# Patient Record
Sex: MALE | Race: BLACK OR AFRICAN AMERICAN | NOT HISPANIC OR LATINO | Employment: UNEMPLOYED | URBAN - NONMETROPOLITAN AREA
[De-identification: names, ages, dates, MRNs, and addresses within clinical notes are randomized per-mention and may not be internally consistent; named-entity substitution may affect disease eponyms.]

---

## 2017-04-26 ENCOUNTER — HOSPITAL ENCOUNTER (INPATIENT)
Facility: HOSPITAL | Age: 47
LOS: 2 days | Discharge: HOME | End: 2017-04-28
Attending: INTERNAL MEDICINE | Admitting: INTERNAL MEDICINE

## 2017-04-26 ENCOUNTER — HOSPITAL ENCOUNTER (EMERGENCY)
Facility: HOSPITAL | Age: 47
End: 2017-04-26

## 2017-04-26 PROBLEM — I21.09 ACUTE MI ANTERIOR WALL FIRST EPISODE CARE (HCC): Status: ACTIVE | Noted: 2017-04-26

## 2017-04-26 LAB
ALBUMIN SERPL-MCNC: 4.3 G/DL (ref 3.4–4.8)
ALBUMIN/GLOB SERPL: 1.4 G/DL (ref 1.1–1.8)
ALP SERPL-CCNC: 108 U/L (ref 38–126)
ALT SERPL W P-5'-P-CCNC: 41 U/L (ref 21–72)
ANION GAP SERPL CALCULATED.3IONS-SCNC: 11 MMOL/L (ref 5–15)
ARTICHOKE IGE QN: 111 MG/DL (ref 1–129)
AST SERPL-CCNC: 71 U/L (ref 17–59)
BILIRUB SERPL-MCNC: 0.7 MG/DL (ref 0.2–1.3)
BUN BLD-MCNC: 9 MG/DL (ref 7–21)
BUN/CREAT SERPL: 13.6 (ref 7–25)
CALCIUM SPEC-SCNC: 9.3 MG/DL (ref 8.4–10.2)
CHLORIDE SERPL-SCNC: 101 MMOL/L (ref 95–110)
CHOLEST SERPL-MCNC: 178 MG/DL (ref 0–199)
CK MB SERPL-CCNC: 19.5 NG/ML (ref 0–5)
CK SERPL-CCNC: 544 U/L (ref 55–170)
CO2 SERPL-SCNC: 23 MMOL/L (ref 22–31)
CREAT BLD-MCNC: 0.66 MG/DL (ref 0.7–1.3)
DEPRECATED RDW RBC AUTO: 41.5 FL (ref 35.1–43.9)
ERYTHROCYTE [DISTWIDTH] IN BLOOD BY AUTOMATED COUNT: 14.1 % (ref 11.5–14.5)
GFR SERPL CREATININE-BSD FRML MDRD: 158 ML/MIN/1.73 (ref 63–147)
GLOBULIN UR ELPH-MCNC: 3 GM/DL (ref 2.3–3.5)
GLUCOSE BLD-MCNC: 116 MG/DL (ref 60–100)
HCT VFR BLD AUTO: 43.6 % (ref 39–49)
HDLC SERPL-MCNC: 50 MG/DL (ref 60–200)
HGB BLD-MCNC: 15.5 G/DL (ref 13.7–17.3)
LDLC/HDLC SERPL: 2.27 {RATIO} (ref 0–3.55)
MCH RBC QN AUTO: 28.7 PG (ref 26.5–34)
MCHC RBC AUTO-ENTMCNC: 35.6 G/DL (ref 31.5–36.3)
MCV RBC AUTO: 80.6 FL (ref 80–98)
PLATELET # BLD AUTO: 230 10*3/MM3 (ref 150–450)
PMV BLD AUTO: 10.1 FL (ref 8–12)
POTASSIUM BLD-SCNC: 3.6 MMOL/L (ref 3.5–5.1)
PROT SERPL-MCNC: 7.3 G/DL (ref 6.3–8.6)
RBC # BLD AUTO: 5.41 10*6/MM3 (ref 4.37–5.74)
SODIUM BLD-SCNC: 135 MMOL/L (ref 137–145)
TRIGL SERPL-MCNC: 73 MG/DL (ref 20–199)
TROPONIN I SERPL-MCNC: 10.2 NG/ML
TROPONIN I SERPL-MCNC: 9.43 NG/ML
WBC NRBC COR # BLD: 8.25 10*3/MM3 (ref 3.2–9.8)

## 2017-04-26 PROCEDURE — 93010 ELECTROCARDIOGRAM REPORT: CPT | Performed by: INTERNAL MEDICINE

## 2017-04-26 PROCEDURE — C1769 GUIDE WIRE: HCPCS | Performed by: INTERNAL MEDICINE

## 2017-04-26 PROCEDURE — 4A023N7 MEASUREMENT OF CARDIAC SAMPLING AND PRESSURE, LEFT HEART, PERCUTANEOUS APPROACH: ICD-10-PCS | Performed by: INTERNAL MEDICINE

## 2017-04-26 PROCEDURE — B2051ZZ PLAIN RADIOGRAPHY OF LEFT HEART USING LOW OSMOLAR CONTRAST: ICD-10-PCS | Performed by: INTERNAL MEDICINE

## 2017-04-26 PROCEDURE — 93458 L HRT ARTERY/VENTRICLE ANGIO: CPT | Performed by: INTERNAL MEDICINE

## 2017-04-26 PROCEDURE — 25010000002 FENTANYL CITRATE (PF) 100 MCG/2ML SOLUTION: Performed by: INTERNAL MEDICINE

## 2017-04-26 PROCEDURE — 82553 CREATINE MB FRACTION: CPT | Performed by: INTERNAL MEDICINE

## 2017-04-26 PROCEDURE — 92928 PRQ TCAT PLMT NTRAC ST 1 LES: CPT | Performed by: INTERNAL MEDICINE

## 2017-04-26 PROCEDURE — 93005 ELECTROCARDIOGRAM TRACING: CPT | Performed by: INTERNAL MEDICINE

## 2017-04-26 PROCEDURE — 82550 ASSAY OF CK (CPK): CPT | Performed by: INTERNAL MEDICINE

## 2017-04-26 PROCEDURE — 80053 COMPREHEN METABOLIC PANEL: CPT | Performed by: INTERNAL MEDICINE

## 2017-04-26 PROCEDURE — C1760 CLOSURE DEV, VASC: HCPCS | Performed by: INTERNAL MEDICINE

## 2017-04-26 PROCEDURE — C1874 STENT, COATED/COV W/DEL SYS: HCPCS | Performed by: INTERNAL MEDICINE

## 2017-04-26 PROCEDURE — C9606 PERC D-E COR REVASC W AMI S: HCPCS | Performed by: INTERNAL MEDICINE

## 2017-04-26 PROCEDURE — C1725 CATH, TRANSLUMIN NON-LASER: HCPCS | Performed by: INTERNAL MEDICINE

## 2017-04-26 PROCEDURE — 80061 LIPID PANEL: CPT | Performed by: INTERNAL MEDICINE

## 2017-04-26 PROCEDURE — C1887 CATHETER, GUIDING: HCPCS | Performed by: INTERNAL MEDICINE

## 2017-04-26 PROCEDURE — 94760 N-INVAS EAR/PLS OXIMETRY 1: CPT

## 2017-04-26 PROCEDURE — 93454 CORONARY ARTERY ANGIO S&I: CPT | Performed by: INTERNAL MEDICINE

## 2017-04-26 PROCEDURE — B2011ZZ PLAIN RADIOGRAPHY OF MULTIPLE CORONARY ARTERIES USING LOW OSMOLAR CONTRAST: ICD-10-PCS | Performed by: INTERNAL MEDICINE

## 2017-04-26 PROCEDURE — C1894 INTRO/SHEATH, NON-LASER: HCPCS | Performed by: INTERNAL MEDICINE

## 2017-04-26 PROCEDURE — 84484 ASSAY OF TROPONIN QUANT: CPT | Performed by: INTERNAL MEDICINE

## 2017-04-26 PROCEDURE — 25010000002 HYDRALAZINE PER 20 MG: Performed by: INTERNAL MEDICINE

## 2017-04-26 PROCEDURE — 99222 1ST HOSP IP/OBS MODERATE 55: CPT | Performed by: INTERNAL MEDICINE

## 2017-04-26 PROCEDURE — 94799 UNLISTED PULMONARY SVC/PX: CPT

## 2017-04-26 PROCEDURE — 027034Z DILATION OF CORONARY ARTERY, ONE ARTERY WITH DRUG-ELUTING INTRALUMINAL DEVICE, PERCUTANEOUS APPROACH: ICD-10-PCS | Performed by: INTERNAL MEDICINE

## 2017-04-26 PROCEDURE — 25010000002 PROTAMINE SULFATE PER 10 MG: Performed by: INTERNAL MEDICINE

## 2017-04-26 PROCEDURE — 0 IOPAMIDOL PER 1 ML: Performed by: INTERNAL MEDICINE

## 2017-04-26 PROCEDURE — 25010000002 HEPARIN (PORCINE) PER 1000 UNITS: Performed by: INTERNAL MEDICINE

## 2017-04-26 PROCEDURE — 85027 COMPLETE CBC AUTOMATED: CPT | Performed by: INTERNAL MEDICINE

## 2017-04-26 DEVICE — XIENCE ALPINE EVEROLIMUS ELUTING CORONARY STENT SYSTEM 4.00 MM X 23 MM / RAPID-EXCHANGE
Type: IMPLANTABLE DEVICE | Status: FUNCTIONAL
Brand: XIENCE ALPINE

## 2017-04-26 RX ORDER — METOPROLOL TARTRATE 50 MG/1
50 TABLET, FILM COATED ORAL EVERY 12 HOURS SCHEDULED
Status: DISCONTINUED | OUTPATIENT
Start: 2017-04-27 | End: 2017-04-28 | Stop reason: HOSPADM

## 2017-04-26 RX ORDER — ATORVASTATIN CALCIUM 10 MG/1
10 TABLET, FILM COATED ORAL DAILY
Status: ON HOLD | COMMUNITY
End: 2017-04-28

## 2017-04-26 RX ORDER — METOPROLOL TARTRATE 5 MG/5ML
INJECTION INTRAVENOUS
Status: COMPLETED
Start: 2017-04-26 | End: 2017-04-26

## 2017-04-26 RX ORDER — METOPROLOL TARTRATE 5 MG/5ML
5 INJECTION INTRAVENOUS ONCE
Status: COMPLETED | OUTPATIENT
Start: 2017-04-26 | End: 2017-04-26

## 2017-04-26 RX ORDER — HYDROCODONE BITARTRATE AND ACETAMINOPHEN 5; 325 MG/1; MG/1
1 TABLET ORAL EVERY 4 HOURS PRN
Status: DISCONTINUED | OUTPATIENT
Start: 2017-04-26 | End: 2017-04-28 | Stop reason: HOSPADM

## 2017-04-26 RX ORDER — NITROGLYCERIN 20 MG/100ML
5-200 INJECTION INTRAVENOUS
Status: DISCONTINUED | OUTPATIENT
Start: 2017-04-26 | End: 2017-04-27

## 2017-04-26 RX ORDER — PROMETHAZINE HYDROCHLORIDE 25 MG/ML
12.5 INJECTION, SOLUTION INTRAMUSCULAR; INTRAVENOUS EVERY 6 HOURS PRN
Status: DISCONTINUED | OUTPATIENT
Start: 2017-04-26 | End: 2017-04-28 | Stop reason: HOSPADM

## 2017-04-26 RX ORDER — CLOPIDOGREL BISULFATE 75 MG/1
75 TABLET ORAL DAILY
Status: DISCONTINUED | OUTPATIENT
Start: 2017-04-27 | End: 2017-04-28 | Stop reason: HOSPADM

## 2017-04-26 RX ORDER — LIDOCAINE HYDROCHLORIDE 20 MG/ML
INJECTION, SOLUTION INFILTRATION; PERINEURAL AS NEEDED
Status: DISCONTINUED | OUTPATIENT
Start: 2017-04-26 | End: 2017-04-26 | Stop reason: HOSPADM

## 2017-04-26 RX ORDER — HEPARIN SODIUM 1000 [USP'U]/ML
INJECTION, SOLUTION INTRAVENOUS; SUBCUTANEOUS AS NEEDED
Status: DISCONTINUED | OUTPATIENT
Start: 2017-04-26 | End: 2017-04-26 | Stop reason: HOSPADM

## 2017-04-26 RX ORDER — ALPRAZOLAM 0.25 MG/1
0.25 TABLET ORAL 3 TIMES DAILY PRN
Status: DISCONTINUED | OUTPATIENT
Start: 2017-04-26 | End: 2017-04-28 | Stop reason: HOSPADM

## 2017-04-26 RX ORDER — NICOTINE 21 MG/24HR
1 PATCH, TRANSDERMAL 24 HOURS TRANSDERMAL EVERY 24 HOURS
Status: DISCONTINUED | OUTPATIENT
Start: 2017-04-26 | End: 2017-04-26

## 2017-04-26 RX ORDER — CALCIUM CARBONATE 200(500)MG
2 TABLET,CHEWABLE ORAL 2 TIMES DAILY PRN
Status: DISCONTINUED | OUTPATIENT
Start: 2017-04-26 | End: 2017-04-28 | Stop reason: HOSPADM

## 2017-04-26 RX ORDER — ATORVASTATIN CALCIUM 40 MG/1
40 TABLET, FILM COATED ORAL NIGHTLY
Status: DISCONTINUED | OUTPATIENT
Start: 2017-04-26 | End: 2017-04-26

## 2017-04-26 RX ORDER — ASPIRIN 325 MG
325 TABLET, DELAYED RELEASE (ENTERIC COATED) ORAL DAILY
Status: DISCONTINUED | OUTPATIENT
Start: 2017-04-26 | End: 2017-04-28 | Stop reason: HOSPADM

## 2017-04-26 RX ORDER — DIPHENHYDRAMINE HYDROCHLORIDE 50 MG/ML
25 INJECTION INTRAMUSCULAR; INTRAVENOUS EVERY 6 HOURS PRN
Status: DISCONTINUED | OUTPATIENT
Start: 2017-04-26 | End: 2017-04-28 | Stop reason: HOSPADM

## 2017-04-26 RX ORDER — LOSARTAN POTASSIUM 50 MG/1
100 TABLET ORAL DAILY
COMMUNITY
End: 2017-04-28 | Stop reason: HOSPADM

## 2017-04-26 RX ORDER — METOPROLOL SUCCINATE 50 MG/1
50 TABLET, EXTENDED RELEASE ORAL DAILY
Status: ON HOLD | COMMUNITY
End: 2017-04-28

## 2017-04-26 RX ORDER — SODIUM CHLORIDE 9 MG/ML
50 INJECTION, SOLUTION INTRAVENOUS CONTINUOUS
Status: DISCONTINUED | OUTPATIENT
Start: 2017-04-26 | End: 2017-04-28 | Stop reason: HOSPADM

## 2017-04-26 RX ORDER — HYDRALAZINE HYDROCHLORIDE 20 MG/ML
INJECTION INTRAMUSCULAR; INTRAVENOUS AS NEEDED
Status: DISCONTINUED | OUTPATIENT
Start: 2017-04-26 | End: 2017-04-26 | Stop reason: HOSPADM

## 2017-04-26 RX ORDER — NITROGLYCERIN 20 MG/100ML
INJECTION INTRAVENOUS CONTINUOUS PRN
Status: DISCONTINUED | OUTPATIENT
Start: 2017-04-26 | End: 2017-04-26 | Stop reason: HOSPADM

## 2017-04-26 RX ORDER — TEMAZEPAM 15 MG/1
15 CAPSULE ORAL NIGHTLY PRN
Status: DISCONTINUED | OUTPATIENT
Start: 2017-04-26 | End: 2017-04-28 | Stop reason: HOSPADM

## 2017-04-26 RX ORDER — LOSARTAN POTASSIUM 50 MG/1
50 TABLET ORAL DAILY
Status: DISCONTINUED | OUTPATIENT
Start: 2017-04-26 | End: 2017-04-27

## 2017-04-26 RX ORDER — FENTANYL CITRATE 50 UG/ML
INJECTION, SOLUTION INTRAMUSCULAR; INTRAVENOUS AS NEEDED
Status: DISCONTINUED | OUTPATIENT
Start: 2017-04-26 | End: 2017-04-26 | Stop reason: HOSPADM

## 2017-04-26 RX ORDER — PROTAMINE SULFATE 10 MG/ML
INJECTION, SOLUTION INTRAVENOUS AS NEEDED
Status: DISCONTINUED | OUTPATIENT
Start: 2017-04-26 | End: 2017-04-26 | Stop reason: HOSPADM

## 2017-04-26 RX ADMIN — ASPIRIN 325 MG: 325 TABLET, DELAYED RELEASE ORAL at 14:55

## 2017-04-26 RX ADMIN — CALCIUM CARBONATE (ANTACID) CHEW TAB 500 MG 2 TABLET: 500 CHEW TAB at 20:36

## 2017-04-26 RX ADMIN — METOPROLOL TARTRATE 5 MG: 5 INJECTION INTRAVENOUS at 15:37

## 2017-04-26 RX ADMIN — METOPROLOL TARTRATE 25 MG: 25 TABLET ORAL at 17:27

## 2017-04-26 RX ADMIN — METOPROLOL TARTRATE 25 MG: 25 TABLET ORAL at 16:20

## 2017-04-26 RX ADMIN — SODIUM CHLORIDE 50 ML/HR: 9 INJECTION, SOLUTION INTRAVENOUS at 14:56

## 2017-04-26 RX ADMIN — NITROGLYCERIN 85 MCG/MIN: 20 INJECTION INTRAVENOUS at 15:41

## 2017-04-26 RX ADMIN — LOSARTAN POTASSIUM 50 MG: 50 TABLET, FILM COATED ORAL at 14:55

## 2017-04-26 RX ADMIN — HYDROCODONE BITARTRATE AND ACETAMINOPHEN 1 TABLET: 5; 325 TABLET ORAL at 15:01

## 2017-04-27 ENCOUNTER — APPOINTMENT (OUTPATIENT)
Dept: CARDIOLOGY | Facility: HOSPITAL | Age: 47
End: 2017-04-27
Attending: INTERNAL MEDICINE

## 2017-04-27 LAB
ANION GAP SERPL CALCULATED.3IONS-SCNC: 11 MMOL/L (ref 5–15)
BH CV ECHO MEAS - ACS: 2.5 CM
BH CV ECHO MEAS - AO MAX PG (FULL): 0.47 MMHG
BH CV ECHO MEAS - AO MAX PG: 8.4 MMHG
BH CV ECHO MEAS - AO MEAN PG (FULL): 1.5 MMHG
BH CV ECHO MEAS - AO MEAN PG: 5.8 MMHG
BH CV ECHO MEAS - AO ROOT AREA: 11.7 CM^2
BH CV ECHO MEAS - AO ROOT DIAM: 3.9 CM
BH CV ECHO MEAS - AO V2 MAX: 145.2 CM/SEC
BH CV ECHO MEAS - AO V2 MEAN: 116.7 CM/SEC
BH CV ECHO MEAS - AO V2 VTI: 23.8 CM
BH CV ECHO MEAS - AVA(I,A): 4.2 CM^2
BH CV ECHO MEAS - AVA(I,D): 4.2 CM^2
BH CV ECHO MEAS - AVA(V,A): 4 CM^2
BH CV ECHO MEAS - AVA(V,D): 4 CM^2
BH CV ECHO MEAS - EDV(CUBED): 81.6 ML
BH CV ECHO MEAS - EDV(TEICH): 84.8 ML
BH CV ECHO MEAS - EF(CUBED): 86.6 %
BH CV ECHO MEAS - EF(TEICH): 80.5 %
BH CV ECHO MEAS - ESV(CUBED): 10.9 ML
BH CV ECHO MEAS - ESV(TEICH): 16.6 ML
BH CV ECHO MEAS - FS: 48.8 %
BH CV ECHO MEAS - IVS/LVPW: 1.2
BH CV ECHO MEAS - IVSD: 1.6 CM
BH CV ECHO MEAS - LA DIMENSION: 3.5 CM
BH CV ECHO MEAS - LA/AO: 0.9
BH CV ECHO MEAS - LV MASS(C)D: 268.2 GRAMS
BH CV ECHO MEAS - LV MAX PG: 8 MMHG
BH CV ECHO MEAS - LV MEAN PG: 4.3 MMHG
BH CV ECHO MEAS - LV V1 MAX: 141.1 CM/SEC
BH CV ECHO MEAS - LV V1 MEAN: 95.3 CM/SEC
BH CV ECHO MEAS - LV V1 VTI: 24.2 CM
BH CV ECHO MEAS - LVIDD: 4.3 CM
BH CV ECHO MEAS - LVIDS: 2.2 CM
BH CV ECHO MEAS - LVOT AREA (M): 4.2 CM^2
BH CV ECHO MEAS - LVOT AREA: 4.1 CM^2
BH CV ECHO MEAS - LVOT DIAM: 2.3 CM
BH CV ECHO MEAS - LVPWD: 1.4 CM
BH CV ECHO MEAS - MV A MAX VEL: 87 CM/SEC
BH CV ECHO MEAS - MV DEC SLOPE: 408.9 CM/SEC^2
BH CV ECHO MEAS - MV E MAX VEL: 54.4 CM/SEC
BH CV ECHO MEAS - MV E/A: 0.63
BH CV ECHO MEAS - MV MAX PG: 8.5 MMHG
BH CV ECHO MEAS - MV MEAN PG: 3.2 MMHG
BH CV ECHO MEAS - MV P1/2T MAX VEL: 64.6 CM/SEC
BH CV ECHO MEAS - MV P1/2T: 46.2 MSEC
BH CV ECHO MEAS - MV V2 MAX: 145.4 CM/SEC
BH CV ECHO MEAS - MV V2 MEAN: 79.3 CM/SEC
BH CV ECHO MEAS - MV V2 VTI: 23.7 CM
BH CV ECHO MEAS - MVA P1/2T LCG: 3.4 CM^2
BH CV ECHO MEAS - MVA(P1/2T): 4.8 CM^2
BH CV ECHO MEAS - MVA(VTI): 4.2 CM^2
BH CV ECHO MEAS - PA MAX PG: 5.7 MMHG
BH CV ECHO MEAS - PA V2 MAX: 119.4 CM/SEC
BH CV ECHO MEAS - RAP SYSTOLE: 5 MMHG
BH CV ECHO MEAS - RVDD: 3 CM
BH CV ECHO MEAS - RVSP: 19.9 MMHG
BH CV ECHO MEAS - SV(AO): 279.8 ML
BH CV ECHO MEAS - SV(CUBED): 70.6 ML
BH CV ECHO MEAS - SV(LVOT): 100 ML
BH CV ECHO MEAS - SV(TEICH): 68.2 ML
BH CV ECHO MEAS - TR MAX VEL: 193 CM/SEC
BUN BLD-MCNC: 12 MG/DL (ref 7–21)
BUN/CREAT SERPL: 15.8 (ref 7–25)
CALCIUM SPEC-SCNC: 9.3 MG/DL (ref 8.4–10.2)
CHLORIDE SERPL-SCNC: 103 MMOL/L (ref 95–110)
CO2 SERPL-SCNC: 23 MMOL/L (ref 22–31)
CREAT BLD-MCNC: 0.76 MG/DL (ref 0.7–1.3)
DEPRECATED RDW RBC AUTO: 41.6 FL (ref 35.1–43.9)
ERYTHROCYTE [DISTWIDTH] IN BLOOD BY AUTOMATED COUNT: 14 % (ref 11.5–14.5)
GFR SERPL CREATININE-BSD FRML MDRD: 134 ML/MIN/1.73 (ref 63–147)
GLUCOSE BLD-MCNC: 116 MG/DL (ref 60–100)
HBA1C MFR BLD: 5.75 % (ref 4–5.6)
HCT VFR BLD AUTO: 40.9 % (ref 39–49)
HGB BLD-MCNC: 14.3 G/DL (ref 13.7–17.3)
MCH RBC QN AUTO: 28.4 PG (ref 26.5–34)
MCHC RBC AUTO-ENTMCNC: 35 G/DL (ref 31.5–36.3)
MCV RBC AUTO: 81.2 FL (ref 80–98)
PLATELET # BLD AUTO: 212 10*3/MM3 (ref 150–450)
PMV BLD AUTO: 10.1 FL (ref 8–12)
POTASSIUM BLD-SCNC: 3.5 MMOL/L (ref 3.5–5.1)
RBC # BLD AUTO: 5.04 10*6/MM3 (ref 4.37–5.74)
SODIUM BLD-SCNC: 137 MMOL/L (ref 137–145)
TROPONIN I SERPL-MCNC: 5.24 NG/ML
WBC NRBC COR # BLD: 10.13 10*3/MM3 (ref 3.2–9.8)

## 2017-04-27 PROCEDURE — 85027 COMPLETE CBC AUTOMATED: CPT | Performed by: INTERNAL MEDICINE

## 2017-04-27 PROCEDURE — 93306 TTE W/DOPPLER COMPLETE: CPT | Performed by: INTERNAL MEDICINE

## 2017-04-27 PROCEDURE — 80048 BASIC METABOLIC PNL TOTAL CA: CPT | Performed by: INTERNAL MEDICINE

## 2017-04-27 PROCEDURE — 94799 UNLISTED PULMONARY SVC/PX: CPT

## 2017-04-27 PROCEDURE — 93306 TTE W/DOPPLER COMPLETE: CPT

## 2017-04-27 PROCEDURE — 84484 ASSAY OF TROPONIN QUANT: CPT | Performed by: INTERNAL MEDICINE

## 2017-04-27 PROCEDURE — 83036 HEMOGLOBIN GLYCOSYLATED A1C: CPT | Performed by: INTERNAL MEDICINE

## 2017-04-27 PROCEDURE — 99232 SBSQ HOSP IP/OBS MODERATE 35: CPT | Performed by: INTERNAL MEDICINE

## 2017-04-27 RX ORDER — TRIAMTERENE AND HYDROCHLOROTHIAZIDE 37.5; 25 MG/1; MG/1
1 CAPSULE ORAL DAILY
Status: DISCONTINUED | OUTPATIENT
Start: 2017-04-27 | End: 2017-04-28 | Stop reason: HOSPADM

## 2017-04-27 RX ORDER — ISOSORBIDE MONONITRATE 30 MG/1
30 TABLET, EXTENDED RELEASE ORAL
Status: DISCONTINUED | OUTPATIENT
Start: 2017-04-27 | End: 2017-04-28

## 2017-04-27 RX ORDER — LOSARTAN POTASSIUM 50 MG/1
50 TABLET ORAL ONCE
Status: COMPLETED | OUTPATIENT
Start: 2017-04-27 | End: 2017-04-27

## 2017-04-27 RX ORDER — ATORVASTATIN CALCIUM 40 MG/1
40 TABLET, FILM COATED ORAL NIGHTLY
Status: DISCONTINUED | OUTPATIENT
Start: 2017-04-27 | End: 2017-04-27

## 2017-04-27 RX ORDER — LOSARTAN POTASSIUM 50 MG/1
100 TABLET ORAL DAILY
Status: DISCONTINUED | OUTPATIENT
Start: 2017-04-28 | End: 2017-04-28 | Stop reason: HOSPADM

## 2017-04-27 RX ADMIN — LOSARTAN POTASSIUM 50 MG: 50 TABLET, FILM COATED ORAL at 12:38

## 2017-04-27 RX ADMIN — TEMAZEPAM 15 MG: 15 CAPSULE ORAL at 19:54

## 2017-04-27 RX ADMIN — TRIAMTERENE AND HYDROCHLOROTHIAZIDE 1 CAPSULE: 25; 37.5 CAPSULE ORAL at 20:39

## 2017-04-27 RX ADMIN — ASPIRIN 325 MG: 325 TABLET, DELAYED RELEASE ORAL at 08:57

## 2017-04-27 RX ADMIN — HYDROCODONE BITARTRATE AND ACETAMINOPHEN 1 TABLET: 5; 325 TABLET ORAL at 01:54

## 2017-04-27 RX ADMIN — METOPROLOL TARTRATE 50 MG: 50 TABLET ORAL at 08:57

## 2017-04-27 RX ADMIN — SODIUM CHLORIDE 50 ML/HR: 9 INJECTION, SOLUTION INTRAVENOUS at 05:28

## 2017-04-27 RX ADMIN — HYDROCODONE BITARTRATE AND ACETAMINOPHEN 1 TABLET: 5; 325 TABLET ORAL at 16:11

## 2017-04-27 RX ADMIN — HYDROCODONE BITARTRATE AND ACETAMINOPHEN 1 TABLET: 5; 325 TABLET ORAL at 20:40

## 2017-04-27 RX ADMIN — LOSARTAN POTASSIUM 50 MG: 50 TABLET, FILM COATED ORAL at 08:57

## 2017-04-27 RX ADMIN — METOPROLOL TARTRATE 50 MG: 50 TABLET ORAL at 19:54

## 2017-04-27 RX ADMIN — CLOPIDOGREL BISULFATE 75 MG: 75 TABLET ORAL at 08:57

## 2017-04-27 RX ADMIN — ISOSORBIDE MONONITRATE 30 MG: 30 TABLET, EXTENDED RELEASE ORAL at 20:40

## 2017-04-28 VITALS
WEIGHT: 212.8 LBS | SYSTOLIC BLOOD PRESSURE: 160 MMHG | TEMPERATURE: 97.1 F | OXYGEN SATURATION: 97 % | BODY MASS INDEX: 29.79 KG/M2 | HEIGHT: 71 IN | DIASTOLIC BLOOD PRESSURE: 120 MMHG | HEART RATE: 76 BPM | RESPIRATION RATE: 18 BRPM

## 2017-04-28 LAB
ANION GAP SERPL CALCULATED.3IONS-SCNC: 9 MMOL/L (ref 5–15)
BUN BLD-MCNC: 11 MG/DL (ref 7–21)
BUN/CREAT SERPL: 12.9 (ref 7–25)
CALCIUM SPEC-SCNC: 9.2 MG/DL (ref 8.4–10.2)
CHLORIDE SERPL-SCNC: 99 MMOL/L (ref 95–110)
CO2 SERPL-SCNC: 28 MMOL/L (ref 22–31)
CREAT BLD-MCNC: 0.85 MG/DL (ref 0.7–1.3)
GFR SERPL CREATININE-BSD FRML MDRD: 118 ML/MIN/1.73 (ref 63–147)
GLUCOSE BLD-MCNC: 128 MG/DL (ref 60–100)
MAGNESIUM SERPL-MCNC: 2 MG/DL (ref 1.6–2.3)
POTASSIUM BLD-SCNC: 3.7 MMOL/L (ref 3.5–5.1)
SODIUM BLD-SCNC: 136 MMOL/L (ref 137–145)
WHOLE BLOOD HOLD SPECIMEN: NORMAL

## 2017-04-28 PROCEDURE — 99238 HOSP IP/OBS DSCHRG MGMT 30/<: CPT | Performed by: INTERNAL MEDICINE

## 2017-04-28 PROCEDURE — 83735 ASSAY OF MAGNESIUM: CPT | Performed by: INTERNAL MEDICINE

## 2017-04-28 PROCEDURE — 80048 BASIC METABOLIC PNL TOTAL CA: CPT | Performed by: INTERNAL MEDICINE

## 2017-04-28 RX ORDER — METOPROLOL SUCCINATE 50 MG/1
50 TABLET, EXTENDED RELEASE ORAL DAILY
Qty: 30 TABLET | Refills: 6 | Status: SHIPPED | OUTPATIENT
Start: 2017-04-28 | End: 2017-10-27 | Stop reason: SDUPTHER

## 2017-04-28 RX ORDER — ATORVASTATIN CALCIUM 40 MG/1
40 TABLET, FILM COATED ORAL DAILY
Qty: 30 TABLET | Refills: 6 | Status: SHIPPED | OUTPATIENT
Start: 2017-04-28 | End: 2017-06-06 | Stop reason: SDUPTHER

## 2017-04-28 RX ORDER — ISOSORBIDE MONONITRATE 60 MG/1
60 TABLET, EXTENDED RELEASE ORAL
Qty: 30 TABLET | Refills: 6 | Status: SHIPPED | OUTPATIENT
Start: 2017-04-28 | End: 2017-10-27 | Stop reason: SDUPTHER

## 2017-04-28 RX ORDER — ISOSORBIDE MONONITRATE 60 MG/1
60 TABLET, EXTENDED RELEASE ORAL
Status: DISCONTINUED | OUTPATIENT
Start: 2017-04-28 | End: 2017-04-28 | Stop reason: HOSPADM

## 2017-04-28 RX ORDER — TRIAMTERENE AND HYDROCHLOROTHIAZIDE 37.5; 25 MG/1; MG/1
1 CAPSULE ORAL DAILY
Qty: 30 CAPSULE | Refills: 6 | Status: SHIPPED | OUTPATIENT
Start: 2017-04-28 | End: 2017-10-27 | Stop reason: SDUPTHER

## 2017-04-28 RX ORDER — LOSARTAN POTASSIUM 100 MG/1
100 TABLET ORAL DAILY
Qty: 30 TABLET | Refills: 6 | Status: SHIPPED | OUTPATIENT
Start: 2017-04-28 | End: 2017-10-27 | Stop reason: SDUPTHER

## 2017-04-28 RX ORDER — CLOPIDOGREL BISULFATE 75 MG/1
75 TABLET ORAL DAILY
Qty: 30 TABLET | Refills: 11 | Status: SHIPPED | OUTPATIENT
Start: 2017-04-28 | End: 2017-11-01 | Stop reason: SDUPTHER

## 2017-04-28 RX ADMIN — HYDROCODONE BITARTRATE AND ACETAMINOPHEN 1 TABLET: 5; 325 TABLET ORAL at 04:58

## 2017-05-02 ENCOUNTER — DOCUMENTATION (OUTPATIENT)
Dept: CARDIAC REHAB | Facility: HOSPITAL | Age: 47
End: 2017-05-02

## 2017-06-06 ENCOUNTER — OFFICE VISIT (OUTPATIENT)
Dept: FAMILY MEDICINE CLINIC | Facility: CLINIC | Age: 47
End: 2017-06-06

## 2017-06-06 VITALS
WEIGHT: 221.4 LBS | BODY MASS INDEX: 31 KG/M2 | HEIGHT: 71 IN | SYSTOLIC BLOOD PRESSURE: 138 MMHG | RESPIRATION RATE: 16 BRPM | HEART RATE: 63 BPM | TEMPERATURE: 97.9 F | DIASTOLIC BLOOD PRESSURE: 102 MMHG

## 2017-06-06 DIAGNOSIS — E78.5 HYPERLIPIDEMIA, UNSPECIFIED HYPERLIPIDEMIA TYPE: ICD-10-CM

## 2017-06-06 DIAGNOSIS — I10 ESSENTIAL HYPERTENSION: ICD-10-CM

## 2017-06-06 DIAGNOSIS — I25.119 CORONARY ARTERY DISEASE INVOLVING NATIVE HEART WITH ANGINA PECTORIS, UNSPECIFIED VESSEL OR LESION TYPE (HCC): ICD-10-CM

## 2017-06-06 DIAGNOSIS — I21.09 ACUTE MI ANTERIOR WALL FIRST EPISODE CARE (HCC): Primary | ICD-10-CM

## 2017-06-06 DIAGNOSIS — Z95.820 S/P ANGIOPLASTY WITH STENT: ICD-10-CM

## 2017-06-06 PROCEDURE — 99204 OFFICE O/P NEW MOD 45 MIN: CPT | Performed by: FAMILY MEDICINE

## 2017-06-06 RX ORDER — AMLODIPINE BESYLATE 5 MG/1
5 TABLET ORAL DAILY
Qty: 30 TABLET | Refills: 11 | Status: SHIPPED | OUTPATIENT
Start: 2017-06-06 | End: 2017-06-20

## 2017-06-06 RX ORDER — ATORVASTATIN CALCIUM 40 MG/1
20 TABLET, FILM COATED ORAL NIGHTLY
Qty: 30 TABLET | Refills: 6 | Status: SHIPPED | OUTPATIENT
Start: 2017-06-06 | End: 2017-11-01 | Stop reason: SDUPTHER

## 2017-06-06 NOTE — PROGRESS NOTES
Subjective   Sourav Moreno is a 46 y.o. male.     Problem List  1. Previous MIS sp cardiac catherization/PCI sp stent one  Drug eluting stent in LAD  2. Hyperlipidemia  3. Prediabetes   4. Essential Hypertension     Family Hx  Mother - heart disease  Father -  Unsure    Pt is 47 yo AAM with the above medical issues. Is here to establish.  Pt states in April 2017 pt experienced chest pain, dizziness and shortness of breath.  He went to [facility] and was airlifted to [facility] and was found to have an Acute anterior ST segment elevation MI. Pt had PCI done with Dr. Martinez and has one drug eluting stent in anterior descending artery.  Pt was eventually discharged on 4/26/17 in stable condition.  He is now taking aspirin 325 mg PO q daily along with lipitor 40 mg PO qhs. plavix 75 mg PO q daily. Imdur 60 mg PO q daily. Losartan 100 mg PO q daily.  Toprol XL 50 mg PO q daily and Dyazide 37.5-25 mg PO q daily.  Pt also has history of hypertension that is not at goal today.  Pt denies smoking. Does have family history of heart disease (Mother).  States since the procedure his chest pain and shortness of breath have resolved. 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  He is also prediabetic.  States he cannot tolerate statin medication lipitor 40 mg PO qhs because it gives him headaches.  He has been taking it during the daytime and not bedtime. Pt has appt with Dr. Martinez (Cardiology) on 6/12/17    Hypertension   This is a recurrent problem. The current episode started more than 1 month ago. The problem is unchanged. The problem is uncontrolled. Associated symptoms include chest pain. Pertinent negatives include no anxiety, blurred vision, headaches, malaise/fatigue, neck pain, orthopnea, palpitations, peripheral edema, PND or shortness of breath. There are no associated agents to hypertension. Risk factors for coronary artery disease include male gender, obesity, sedentary lifestyle and dyslipidemia. Past treatments include  angiotensin blockers, beta blockers, diuretics and lifestyle changes. Compliance problems include medication side effects.  Hypertensive end-organ damage includes CAD/MI. There is no history of angina, kidney disease, CVA, heart failure, left ventricular hypertrophy, PVD, renovascular disease or retinopathy. There is no history of chronic renal disease, coarctation of the aorta, hyperaldosteronism, hypercortisolism, hyperparathyroidism, a hypertension causing med, pheochromocytoma or sleep apnea.   Coronary Artery Disease   Presents for initial visit. The disease course has been stable. Symptoms include chest pain and chest tightness. Pertinent negatives include no chest pressure, dizziness, leg swelling, muscle weakness, palpitations, shortness of breath or weight gain. Risk factors include obesity and stress. Risk factors do not include decreased physical activity, diabetes, premature CAD in family, hyperlipidemia or hypertension. Past treatments include antiplatelet agents, aspirin and nitrates. The treatment provided significant relief. Compliance with prior treatments has been good. His past medical history is significant for past myocardial infarction and valvular heart disease. There is no history of angina pectoris, arrhythmia, cardiomyopathy, CHF or pericarditis. Past surgical history includes angioplasty and cardiac stents.      The following portions of the patient's history were reviewed and updated as appropriate: allergies, current medications, past family history, past medical history, past social history, past surgical history and problem list.    Review of Systems   Constitutional: Negative.  Negative for malaise/fatigue and weight gain.   HENT: Negative.    Eyes: Negative.  Negative for blurred vision.   Respiratory: Positive for chest tightness. Negative for shortness of breath.    Cardiovascular: Positive for chest pain. Negative for palpitations, orthopnea, leg swelling and PND.  "  Gastrointestinal: Negative.    Endocrine: Negative.    Genitourinary: Negative.    Musculoskeletal: Negative.  Negative for muscle weakness and neck pain.   Skin: Negative.    Allergic/Immunologic: Negative.    Neurological: Negative.  Negative for dizziness and headaches.   Hematological: Negative.    Psychiatric/Behavioral: Negative.        Objective    BP (!) 138/102  Pulse 63  Temp 97.9 °F (36.6 °C)  Resp 16  Ht 71\" (180.3 cm)  Wt 221 lb 6.4 oz (100 kg)  BMI 30.88 kg/m2    BP (!) 138/102  Pulse 63  Temp 97.9 °F (36.6 °C)  Resp 16  Ht 71\" (180.3 cm)  Wt 221 lb 6.4 oz (100 kg)  BMI 30.88 kg/m2    Chemistry        Component Value Date/Time     (L) 04/28/2017 0607    K 3.7 04/28/2017 0607    CL 99 04/28/2017 0607    CO2 28.0 04/28/2017 0607    BUN 11 04/28/2017 0607    CREATININE 0.85 04/28/2017 0607        Component Value Date/Time    CALCIUM 9.2 04/28/2017 0607    ALKPHOS 108 04/26/2017 1557    AST 71 (H) 04/26/2017 1557    ALT 41 04/26/2017 1557    BILITOT 0.7 04/26/2017 1557        Lab Results   Component Value Date    WBC 10.13 (H) 04/27/2017    HGB 14.3 04/27/2017    HCT 40.9 04/27/2017    MCV 81.2 04/27/2017     04/27/2017     Lab Results   Component Value Date    CHOL 178 04/26/2017     Lab Results   Component Value Date    TRIG 73 04/26/2017     Lab Results   Component Value Date    HDL 50 (L) 04/26/2017     No results found for: LDLCALC  No results found for: LDL  No results found for: HDLLDLRATIO  No components found for: CHOLHDL  Lab Results   Component Value Date    HGBA1C 5.75 (H) 04/27/2017     Admission on 04/26/2017, Discharged on 04/28/2017   Component Date Value Ref Range Status   •  CV ECHO RAJENDRA - RVDD 04/27/2017 3.0  cm Preliminary   • IVSd 04/27/2017 1.6  cm Preliminary   • LVIDd 04/27/2017 4.3  cm Preliminary   • LVIDs 04/27/2017 2.2  cm Preliminary   • LVPWd 04/27/2017 1.4  cm Preliminary   • IVS/LVPW 04/27/2017 1.2   Preliminary   • FS 04/27/2017 48.8  % " Preliminary   • EDV(Teich) 04/27/2017 84.8  ml Preliminary   • ESV(Teich) 04/27/2017 16.6  ml Preliminary   • EF(Teich) 04/27/2017 80.5  % Preliminary   • EDV(cubed) 04/27/2017 81.6  ml Preliminary   • ESV(cubed) 04/27/2017 10.9  ml Preliminary   • EF(cubed) 04/27/2017 86.6  % Preliminary   • LV mass(C)d 04/27/2017 268.2  grams Preliminary   • SV(Teich) 04/27/2017 68.2  ml Preliminary   • SV(cubed) 04/27/2017 70.6  ml Preliminary   • Ao root diam 04/27/2017 3.9  cm Preliminary   • Ao root area 04/27/2017 11.7  cm^2 Preliminary   • ACS 04/27/2017 2.5  cm Preliminary   • LA dimension 04/27/2017 3.5  cm Preliminary   • LA/Ao 04/27/2017 0.9   Preliminary   • LVOT diam 04/27/2017 2.3  cm Preliminary   • LVOT area 04/27/2017 4.1  cm^2 Preliminary   • LVOT area(traced) 04/27/2017 4.2  cm^2 Preliminary   • MV E max la 04/27/2017 54.4  cm/sec Preliminary   • MV A max la 04/27/2017 87.0  cm/sec Preliminary   • MV E/A 04/27/2017 0.63   Preliminary   • MV V2 max 04/27/2017 145.4  cm/sec Preliminary   • MV max PG 04/27/2017 8.5  mmHg Preliminary   • MV V2 mean 04/27/2017 79.3  cm/sec Preliminary   • MV mean PG 04/27/2017 3.2  mmHg Preliminary   • MV V2 VTI 04/27/2017 23.7  cm Preliminary   • MVA(VTI) 04/27/2017 4.2  cm^2 Preliminary   • MV P1/2t max la 04/27/2017 64.6  cm/sec Preliminary   • MV P1/2t 04/27/2017 46.2  msec Preliminary   • MVA(P1/2t) 04/27/2017 4.8  cm^2 Preliminary   • MV dec slope 04/27/2017 408.9  cm/sec^2 Preliminary   • Ao pk la 04/27/2017 145.2  cm/sec Preliminary   • Ao max PG 04/27/2017 8.4  mmHg Preliminary   • Ao max PG (full) 04/27/2017 0.47  mmHg Preliminary   • Ao V2 mean 04/27/2017 116.7  cm/sec Preliminary   • Ao mean PG 04/27/2017 5.8  mmHg Preliminary   • Ao mean PG (full) 04/27/2017 1.5  mmHg Preliminary   • Ao V2 VTI 04/27/2017 23.8  cm Preliminary   • ALHAJI(I,A) 04/27/2017 4.2  cm^2 Preliminary   • ALHAJI(I,D) 04/27/2017 4.2  cm^2 Preliminary   • ALHAJI(V,A) 04/27/2017 4.0  cm^2 Preliminary   •  ALHAJI(V,D) 04/27/2017 4.0  cm^2 Preliminary   • LV V1 max PG 04/27/2017 8.0  mmHg Preliminary   • LV V1 mean PG 04/27/2017 4.3  mmHg Preliminary   • LV V1 max 04/27/2017 141.1  cm/sec Preliminary   • LV V1 mean 04/27/2017 95.3  cm/sec Preliminary   • LV V1 VTI 04/27/2017 24.2  cm Preliminary   • SV(Ao) 04/27/2017 279.8  ml Preliminary   • SV(LVOT) 04/27/2017 100.0  ml Preliminary   • PA V2 max 04/27/2017 119.4  cm/sec Preliminary   • PA max PG 04/27/2017 5.7  mmHg Preliminary   • TR max la 04/27/2017 193.0  cm/sec Preliminary   • RVSP(TR) 04/27/2017 19.9  mmHg Preliminary   • RAP systole 04/27/2017 5.0  mmHg Preliminary   • MVA P1/2T LCG 04/27/2017 3.4  cm^2 Preliminary   • Troponin I 04/26/2017 10.200* <=0.034 ng/mL Final   • Troponin I 04/26/2017 9.430* <=0.034 ng/mL Final   • Creatine Kinase 04/26/2017 544* 55 - 170 U/L Final   • CKMB 04/26/2017 19.50* 0.00 - 5.00 ng/mL Final   • Glucose 04/26/2017 116* 60 - 100 mg/dL Final   • BUN 04/26/2017 9  7 - 21 mg/dL Final   • Creatinine 04/26/2017 0.66* 0.70 - 1.30 mg/dL Final   • Sodium 04/26/2017 135* 137 - 145 mmol/L Final   • Potassium 04/26/2017 3.6  3.5 - 5.1 mmol/L Final   • Chloride 04/26/2017 101  95 - 110 mmol/L Final   • CO2 04/26/2017 23.0  22.0 - 31.0 mmol/L Final   • Calcium 04/26/2017 9.3  8.4 - 10.2 mg/dL Final   • Total Protein 04/26/2017 7.3  6.3 - 8.6 g/dL Final   • Albumin 04/26/2017 4.30  3.40 - 4.80 g/dL Final   • ALT (SGPT) 04/26/2017 41  21 - 72 U/L Final   • AST (SGOT) 04/26/2017 71* 17 - 59 U/L Final   • Alkaline Phosphatase 04/26/2017 108  38 - 126 U/L Final   • Total Bilirubin 04/26/2017 0.7  0.2 - 1.3 mg/dL Final   • eGFR   Amer 04/26/2017 158* 63 - 147 mL/min/1.73 Final   • Globulin 04/26/2017 3.0  2.3 - 3.5 gm/dL Final   • A/G Ratio 04/26/2017 1.4  1.1 - 1.8 g/dL Final   • BUN/Creatinine Ratio 04/26/2017 13.6  7.0 - 25.0 Final   • Anion Gap 04/26/2017 11.0  5.0 - 15.0 mmol/L Final   • WBC 04/26/2017 8.25  3.20 - 9.80 10*3/mm3 Final    • RBC 04/26/2017 5.41  4.37 - 5.74 10*6/mm3 Final   • Hemoglobin 04/26/2017 15.5  13.7 - 17.3 g/dL Final   • Hematocrit 04/26/2017 43.6  39.0 - 49.0 % Final   • MCV 04/26/2017 80.6  80.0 - 98.0 fL Final   • MCH 04/26/2017 28.7  26.5 - 34.0 pg Final   • MCHC 04/26/2017 35.6  31.5 - 36.3 g/dL Final   • RDW 04/26/2017 14.1  11.5 - 14.5 % Final   • RDW-SD 04/26/2017 41.5  35.1 - 43.9 fl Final   • MPV 04/26/2017 10.1  8.0 - 12.0 fL Final   • Platelets 04/26/2017 230  150 - 450 10*3/mm3 Final   • Total Cholesterol 04/26/2017 178  0 - 199 mg/dL Final   • Triglycerides 04/26/2017 73  20 - 199 mg/dL Final   • HDL Cholesterol 04/26/2017 50* 60 - 200 mg/dL Final   • LDL Cholesterol  04/26/2017 111  1 - 129 mg/dL Final   • LDL/HDL Ratio 04/26/2017 2.27  0.00 - 3.55 Final   • Troponin I 04/27/2017 5.240* <=0.034 ng/mL Final   • WBC 04/27/2017 10.13* 3.20 - 9.80 10*3/mm3 Final   • RBC 04/27/2017 5.04  4.37 - 5.74 10*6/mm3 Final   • Hemoglobin 04/27/2017 14.3  13.7 - 17.3 g/dL Final   • Hematocrit 04/27/2017 40.9  39.0 - 49.0 % Final   • MCV 04/27/2017 81.2  80.0 - 98.0 fL Final   • MCH 04/27/2017 28.4  26.5 - 34.0 pg Final   • MCHC 04/27/2017 35.0  31.5 - 36.3 g/dL Final   • RDW 04/27/2017 14.0  11.5 - 14.5 % Final   • RDW-SD 04/27/2017 41.6  35.1 - 43.9 fl Final   • MPV 04/27/2017 10.1  8.0 - 12.0 fL Final   • Platelets 04/27/2017 212  150 - 450 10*3/mm3 Final   • Glucose 04/27/2017 116* 60 - 100 mg/dL Final   • BUN 04/27/2017 12  7 - 21 mg/dL Final   • Creatinine 04/27/2017 0.76  0.70 - 1.30 mg/dL Final   • Sodium 04/27/2017 137  137 - 145 mmol/L Final   • Potassium 04/27/2017 3.5  3.5 - 5.1 mmol/L Final   • Chloride 04/27/2017 103  95 - 110 mmol/L Final   • CO2 04/27/2017 23.0  22.0 - 31.0 mmol/L Final   • Calcium 04/27/2017 9.3  8.4 - 10.2 mg/dL Final   • eGFR   Amer 04/27/2017 134  63 - 147 mL/min/1.73 Final   • BUN/Creatinine Ratio 04/27/2017 15.8  7.0 - 25.0 Final   • Anion Gap 04/27/2017 11.0  5.0 - 15.0 mmol/L  Final   • Hemoglobin A1C 04/27/2017 5.75* 4 - 5.6 % Final   • Glucose 04/28/2017 128* 60 - 100 mg/dL Final   • BUN 04/28/2017 11  7 - 21 mg/dL Final   • Creatinine 04/28/2017 0.85  0.70 - 1.30 mg/dL Final   • Sodium 04/28/2017 136* 137 - 145 mmol/L Final   • Potassium 04/28/2017 3.7  3.5 - 5.1 mmol/L Final   • Chloride 04/28/2017 99  95 - 110 mmol/L Final   • CO2 04/28/2017 28.0  22.0 - 31.0 mmol/L Final   • Calcium 04/28/2017 9.2  8.4 - 10.2 mg/dL Final   • eGFR   Amer 04/28/2017 118  63 - 147 mL/min/1.73 Final   • BUN/Creatinine Ratio 04/28/2017 12.9  7.0 - 25.0 Final   • Anion Gap 04/28/2017 9.0  5.0 - 15.0 mmol/L Final   • Magnesium 04/28/2017 2.0  1.6 - 2.3 mg/dL Final   • Extra Tube 04/28/2017 hold for add-on   Final    Auto resulted       Physical Exam   Constitutional: He is oriented to person, place, and time. He appears well-developed and well-nourished. No distress.   HENT:   Head: Normocephalic and atraumatic.   Right Ear: External ear normal.   Left Ear: External ear normal.   Eyes: Conjunctivae and EOM are normal. Pupils are equal, round, and reactive to light. Right eye exhibits no discharge. Left eye exhibits no discharge. No scleral icterus.   Neck: Normal range of motion. Neck supple. No JVD present. No tracheal deviation present. No thyromegaly present.   Cardiovascular: Normal rate and regular rhythm.    Pulmonary/Chest: Effort normal and breath sounds normal. No stridor. No respiratory distress. He has no wheezes.   Abdominal: Soft. Bowel sounds are normal. He exhibits no distension and no mass. There is no tenderness. There is no rebound and no guarding. No hernia.   Musculoskeletal: Normal range of motion. He exhibits no edema, tenderness or deformity.   Lymphadenopathy:     He has no cervical adenopathy.   Neurological: He is alert and oriented to person, place, and time. He has normal reflexes. No cranial nerve deficit. Coordination normal.   Skin: Skin is warm and dry. No rash noted.  He is not diaphoretic. No erythema. No pallor.   Psychiatric: He has a normal mood and affect. His behavior is normal.   Nursing note and vitals reviewed.      Assessment/Plan   Problems Addressed this Visit        Cardiovascular and Mediastinum    Acute MI anterior wall first episode care - Primary    Relevant Medications    amLODIPine (NORVASC) 5 MG tablet    Hyperlipidemia    Relevant Medications    atorvastatin (LIPITOR) 40 MG tablet    Essential hypertension    Relevant Medications    amLODIPine (NORVASC) 5 MG tablet       Other    S/P angioplasty with stent      Other Visit Diagnoses     Coronary artery disease involving native heart with angina pectoris, unspecified vessel or lesion type            - for CAD continue aspirin, losartan plavix, imdur, losartan and metoprolol  - hyperlipidemia - advised pt to cut down on lipitor from 40 to 20 mg at bedtime and see how he does with that  - hypertension - continue with losartan, dyazide and metoprolol. BP not at goal. Will add norvasc 5 mg PO q daily  - sp angioplasty with stent - Cardiology following. Pt has appt with Dr. Martinez next week  - Advised pt to check BP at home.  Gave prescription for BP to check at home. Goal of BP <140/90  - recheck in 2 weeks

## 2017-06-06 NOTE — PATIENT INSTRUCTIONS
BP machine and check Blood pressure daily. Get at pharmacy or at medical supply store such as [facility] or [facility]    Goal of BP should be <140/90    Amlodipine tablets  What is this medicine?  AMLODIPINE (am NIKOLAY arnoldo viviane) is a calcium-channel blocker. It affects the amount of calcium found in your heart and muscle cells. This relaxes your blood vessels, which can reduce the amount of work the heart has to do. This medicine is used to lower high blood pressure. It is also used to prevent chest pain.  This medicine may be used for other purposes; ask your health care provider or pharmacist if you have questions.  COMMON BRAND NAME(S): Norvasc  What should I tell my health care provider before I take this medicine?  They need to know if you have any of these conditions:  -heart problems like heart failure or aortic stenosis  -liver disease  -an unusual or allergic reaction to amlodipine, other medicines, foods, dyes, or preservatives  -pregnant or trying to get pregnant  -breast-feeding  How should I use this medicine?  Take this medicine by mouth with a glass of water. Follow the directions on the prescription label. Take your medicine at regular intervals. Do not take more medicine than directed.  Talk to your pediatrician regarding the use of this medicine in children. Special care may be needed. This medicine has been used in children as young as 6.  Persons over 65 years old may have a stronger reaction to this medicine and need smaller doses.  Overdosage: If you think you have taken too much of this medicine contact a poison control center or emergency room at once.  NOTE: This medicine is only for you. Do not share this medicine with others.  What if I miss a dose?  If you miss a dose, take it as soon as you can. If it is almost time for your next dose, take only that dose. Do not take double or extra doses.  What may interact with this medicine?  -herbal or dietary  supplements  -local or general anesthetics  -medicines for high blood pressure  -medicines for prostate problems  -rifampin  This list may not describe all possible interactions. Give your health care provider a list of all the medicines, herbs, non-prescription drugs, or dietary supplements you use. Also tell them if you smoke, drink alcohol, or use illegal drugs. Some items may interact with your medicine.  What should I watch for while using this medicine?  Visit your doctor or health care professional for regular check ups. Check your blood pressure and pulse rate regularly. Ask your health care professional what your blood pressure and pulse rate should be, and when you should contact him or her.  This medicine may make you feel confused, dizzy or lightheaded. Do not drive, use machinery, or do anything that needs mental alertness until you know how this medicine affects you. To reduce the risk of dizzy or fainting spells, do not sit or stand up quickly, especially if you are an older patient. Avoid alcoholic drinks; they can make you more dizzy.  Do not suddenly stop taking amlodipine. Ask your doctor or health care professional how you can gradually reduce the dose.  What side effects may I notice from receiving this medicine?  Side effects that you should report to your doctor or health care professional as soon as possible:  -allergic reactions like skin rash, itching or hives, swelling of the face, lips, or tongue  -breathing problems  -changes in vision or hearing  -chest pain  -fast, irregular heartbeat  -swelling of legs or ankles  Side effects that usually do not require medical attention (report to your doctor or health care professional if they continue or are bothersome):  -dry mouth  -facial flushing  -nausea, vomiting  -stomach gas, pain  -tired, weak  -trouble sleeping  This list may not describe all possible side effects. Call your doctor for medical advice about side effects. You may report  side effects to FDA at 4-787-FDA-5601.  Where should I keep my medicine?  Keep out of the reach of children.  Store at room temperature between 59 and 86 degrees F (15 and 30 degrees C). Protect from light. Keep container tightly closed. Throw away any unused medicine after the expiration date.  NOTE: This sheet is a summary. It may not cover all possible information. If you have questions about this medicine, talk to your doctor, pharmacist, or health care provider.     © 2017, [employer]. (2013-11-15 11:40:58)

## 2017-06-16 ENCOUNTER — OFFICE VISIT (OUTPATIENT)
Dept: CARDIOLOGY | Facility: CLINIC | Age: 47
End: 2017-06-16

## 2017-06-16 VITALS
SYSTOLIC BLOOD PRESSURE: 146 MMHG | BODY MASS INDEX: 31.08 KG/M2 | HEART RATE: 61 BPM | WEIGHT: 222 LBS | HEIGHT: 71 IN | DIASTOLIC BLOOD PRESSURE: 90 MMHG | OXYGEN SATURATION: 98 %

## 2017-06-16 DIAGNOSIS — I25.10 CORONARY ARTERY DISEASE INVOLVING NATIVE CORONARY ARTERY OF NATIVE HEART WITHOUT ANGINA PECTORIS: Primary | ICD-10-CM

## 2017-06-16 DIAGNOSIS — I10 ESSENTIAL HYPERTENSION: ICD-10-CM

## 2017-06-16 DIAGNOSIS — E78.5 HYPERLIPIDEMIA, UNSPECIFIED HYPERLIPIDEMIA TYPE: ICD-10-CM

## 2017-06-16 PROCEDURE — 99213 OFFICE O/P EST LOW 20 MIN: CPT | Performed by: INTERNAL MEDICINE

## 2017-06-16 NOTE — PROGRESS NOTES
Chief complaint : Coronary artery disease    History of Present Illness 46-year-old gentleman who comes today for a follow-up visit.  Patient has done very well.  He has no chest pain or chest discomfort.  He denies shortness of breath orthopnea or PND.    Subjective      Review of Systems   Constitution: Negative. Negative for decreased appetite, diaphoresis, weakness, night sweats, weight gain and weight loss.   HENT: Negative for headaches, hearing loss, nosebleeds and sore throat.    Eyes: Negative.  Negative for blurred vision and photophobia.   Cardiovascular: Negative for chest pain, claudication, dyspnea on exertion, irregular heartbeat, leg swelling, palpitations, paroxysmal nocturnal dyspnea and syncope.   Respiratory: Negative for cough, hemoptysis, shortness of breath and wheezing.    Endocrine: Negative for cold intolerance, heat intolerance, polydipsia, polyphagia and polyuria.   Hematologic/Lymphatic: Negative.    Skin: Negative for color change, dry skin, flushing, itching and rash.   Musculoskeletal: Negative.  Negative for muscle cramps, muscle weakness and myalgias.   Gastrointestinal: Negative for abdominal pain, change in bowel habit, diarrhea, hematemesis, melena, nausea and vomiting.   Genitourinary: Negative for dysuria, frequency and hematuria.   Neurological: Negative for dizziness, focal weakness, light-headedness, loss of balance, numbness and seizures.   Psychiatric/Behavioral: Negative.  Negative for substance abuse, suicidal ideas and thoughts of violence.   Allergic/Immunologic: Negative.        Past Medical History:   Diagnosis Date   • Hypertension    • Malignant hypertension    • Venereal disease screening        Family History   Problem Relation Age of Onset   • Heart disease Mother    • Hypertension Mother    • Hypertension Father    • Diabetes Sister    • Hypertension Sister    • Diabetes Brother    • Heart disease Brother    • Hypertension Brother        Lisinopril     reports  that he has never smoked. He has never used smokeless tobacco. He reports that he drinks alcohol. He reports that he uses illicit drugs, including Marijuana.    Objective     Vital Signs  Heart Rate:  [61] 61  BP: (146)/(90) 146/90    Physical Exam   Constitutional: He is oriented to person, place, and time. He appears well-developed and well-nourished.   HENT:   Head: Normocephalic and atraumatic.   Eyes: Conjunctivae and EOM are normal. Pupils are equal, round, and reactive to light.   Neck: Neck supple. No JVD present. Carotid bruit is not present. No tracheal deviation and no edema present.   Cardiovascular: Normal rate, regular rhythm, S1 normal, S2 normal, normal heart sounds and intact distal pulses.  Exam reveals no gallop, no S3, no S4 and no friction rub.    No murmur heard.  Pulmonary/Chest: Effort normal and breath sounds normal. He has no wheezes. He has no rales. He exhibits no tenderness.   Abdominal: Bowel sounds are normal. He exhibits no abdominal bruit and no pulsatile midline mass. There is no rebound and no guarding.   Musculoskeletal: Normal range of motion. He exhibits no edema.   Neurological: He is alert and oriented to person, place, and time.   Skin: Skin is warm and dry.   Psychiatric: He has a normal mood and affect.       Procedures    Assessment/Plan     Patient Active Problem List   Diagnosis   • Acute MI anterior wall first episode care   • Hyperlipidemia   • Essential hypertension   • S/P angioplasty with stent   • Coronary artery disease involving native heart with angina pectoris   • Coronary artery disease involving native coronary artery of native heart without angina pectoris     1. Coronary artery disease involving native coronary artery of native heart without angina pectoris  April 26, 2017 patient presented with ST segment elevation myocardial infarction.  4.0 x 23 mm Xience Alpine stent was placed to the proximal LAD.  Plan:  · Continue with dual antiplatelet  treatment.  · Continue with risk factor modification  · Continue with guideline direct medical therapy.    2. Hyperlipidemia, unspecified hyperlipidemia type    Component      Latest Ref Rng & Units 4/26/2017   Total Cholesterol      0 - 199 mg/dL 178   Triglycerides      20 - 199 mg/dL 73   HDL Cholesterol      60 - 200 mg/dL 50 (L)   LDL Cholesterol      1 - 129 mg/dL 111   LDL/HDL Ratio      0.00 - 3.55 2.27   Plan:  · We will continue with current dose of atorvastatin.  · We'll continue to monitor fasting lipid panel and liver function test  · Patient is advised to engage in a regular exercise program.    3. Essential hypertension  · Patient's blood pressure is well-controlled.  · We'll continue with current medications.    I discussed the patients findings and my recommendations with patient.          This document has been electronically signed by Tobi Martinez MD on June 16, 2017 9:20 AM     Tobi Martinez MD  06/16/17  9:17 AM

## 2017-06-20 ENCOUNTER — OFFICE VISIT (OUTPATIENT)
Dept: FAMILY MEDICINE CLINIC | Facility: CLINIC | Age: 47
End: 2017-06-20

## 2017-06-20 VITALS
HEART RATE: 64 BPM | HEIGHT: 71 IN | SYSTOLIC BLOOD PRESSURE: 134 MMHG | TEMPERATURE: 98.6 F | BODY MASS INDEX: 31.3 KG/M2 | WEIGHT: 223.6 LBS | RESPIRATION RATE: 16 BRPM | DIASTOLIC BLOOD PRESSURE: 92 MMHG

## 2017-06-20 DIAGNOSIS — E66.9 OBESITY, UNSPECIFIED OBESITY SEVERITY, UNSPECIFIED OBESITY TYPE: ICD-10-CM

## 2017-06-20 DIAGNOSIS — I10 ESSENTIAL HYPERTENSION: Primary | ICD-10-CM

## 2017-06-20 PROCEDURE — 99213 OFFICE O/P EST LOW 20 MIN: CPT | Performed by: FAMILY MEDICINE

## 2017-06-20 RX ORDER — AMLODIPINE BESYLATE 10 MG/1
10 TABLET ORAL DAILY
Qty: 30 TABLET | Refills: 11 | Status: SHIPPED | OUTPATIENT
Start: 2017-06-20 | End: 2018-07-11 | Stop reason: SDUPTHER

## 2017-06-20 NOTE — PATIENT INSTRUCTIONS
"Take 2 pills of norvasc from 5 to 10 mg daily.  Double on 5 mg then  new prescription     DASH Eating Plan  DASH stands for \"Dietary Approaches to Stop Hypertension.\" The DASH eating plan is a healthy eating plan that has been shown to reduce high blood pressure (hypertension). Additional health benefits may include reducing the risk of type 2 diabetes mellitus, heart disease, and stroke. The DASH eating plan may also help with weight loss.  WHAT DO I NEED TO KNOW ABOUT THE DASH EATING PLAN?  For the DASH eating plan, you will follow these general guidelines:  · Choose foods with less than 150 milligrams of sodium per serving (as listed on the food label).  · Use salt-free seasonings or herbs instead of table salt or sea salt.  · Check with your health care provider or pharmacist before using salt substitutes.  · Eat lower-sodium products. These are often labeled as \"low-sodium\" or \"no salt added.\"  · Eat fresh foods. Avoid eating a lot of canned foods.  · Eat more vegetables, fruits, and low-fat dairy products.  · Choose whole grains. Look for the word \"whole\" as the first word in the ingredient list.  · Choose fish and skinless chicken or turkey more often than red meat. Limit fish, poultry, and meat to 6 oz (170 g) each day.  · Limit sweets, desserts, sugars, and sugary drinks.  · Choose heart-healthy fats.  · Eat more home-cooked food and less restaurant, buffet, and fast food.  · Limit fried foods.  · Do not christy foods. Cook foods using methods such as baking, boiling, grilling, and broiling instead.  · When eating at a restaurant, ask that your food be prepared with less salt, or no salt if possible.  WHAT FOODS CAN I EAT?  Seek help from a dietitian for individual calorie needs.  Grains  Whole grain or whole wheat bread. Brown rice. Whole grain or whole wheat pasta. Quinoa, bulgur, and whole grain cereals. Low-sodium cereals. Corn or whole wheat flour tortillas. Whole grain cornbread. Whole grain " crackers. Low-sodium crackers.  Vegetables  Fresh or frozen vegetables (raw, steamed, roasted, or grilled). Low-sodium or reduced-sodium tomato and vegetable juices. Low-sodium or reduced-sodium tomato sauce and paste. Low-sodium or reduced-sodium canned vegetables.   Fruits  All fresh, canned (in natural juice), or frozen fruits.  Meat and Other Protein Products  Ground beef (85% or leaner), grass-fed beef, or beef trimmed of fat. Skinless chicken or turkey. Ground chicken or turkey. Pork trimmed of fat. All fish and seafood. Eggs. Dried beans, peas, or lentils. Unsalted nuts and seeds. Unsalted canned beans.  Dairy  Low-fat dairy products, such as skim or 1% milk, 2% or reduced-fat cheeses, low-fat ricotta or cottage cheese, or plain low-fat yogurt. Low-sodium or reduced-sodium cheeses.  Fats and Oils  Tub margarines without trans fats. Light or reduced-fat mayonnaise and salad dressings (reduced sodium). Avocado. Safflower, olive, or canola oils. Natural peanut or almond butter.  Other  Unsalted popcorn and pretzels.  The items listed above may not be a complete list of recommended foods or beverages. Contact your dietitian for more options.  WHAT FOODS ARE NOT RECOMMENDED?  Grains  White bread. White pasta. White rice. Refined cornbread. Bagels and croissants. Crackers that contain trans fat.  Vegetables  Creamed or fried vegetables. Vegetables in a cheese sauce. Regular canned vegetables. Regular canned tomato sauce and paste. Regular tomato and vegetable juices.  Fruits  Canned fruit in light or heavy syrup. Fruit juice.  Meat and Other Protein Products  Fatty cuts of meat. Ribs, chicken wings, lara, sausage, bologna, salami, chitterlings, fatback, hot dogs, bratwurst, and packaged luncheon meats. Salted nuts and seeds. Canned beans with salt.  Dairy  Whole or 2% milk, cream, half-and-half, and cream cheese. Whole-fat or sweetened yogurt. Full-fat cheeses or blue cheese. Nondairy creamers and whipped  toppings. Processed cheese, cheese spreads, or cheese curds.  Condiments  Onion and garlic salt, seasoned salt, table salt, and sea salt. Canned and packaged gravies. Worcestershire sauce. Tartar sauce. Barbecue sauce. Teriyaki sauce. Soy sauce, including reduced sodium. Steak sauce. Fish sauce. Oyster sauce. Cocktail sauce. Horseradish. Ketchup and mustard. Meat flavorings and tenderizers. Bouillon cubes. Hot sauce. Tabasco sauce. Marinades. Taco seasonings. Relishes.  Fats and Oils  Butter, stick margarine, lard, shortening, ghee, and lara fat. Coconut, palm kernel, or palm oils. Regular salad dressings.  Other  Pickles and olives. Salted popcorn and pretzels.  The items listed above may not be a complete list of foods and beverages to avoid. Contact your dietitian for more information.  WHERE CAN I FIND MORE INFORMATION?  National [employer], [employer], [employer] [place]: www.nhlbi.nih.gov/health/health-topics/topics/dash/     This information is not intended to replace advice given to you by your health care provider. Make sure you discuss any questions you have with your health care provider.     Document Released: 12/06/2012 Document Revised: 04/10/2017 Document Reviewed: 10/22/2014  [employer] Interactive Patient Education ©2017 [employer]

## 2017-06-20 NOTE — PROGRESS NOTES
"Mckenna Moreno is a 46 y.o. male.     History of Present Illness     Problem List  1. Previous MI sp cardiac catherization/PCI sp stent one Drug eluting stent in LAD  2. Hyperlipidemia  3. Prediabetes   4. Essential Hypertension   5. Obesity      Family Hx  Mother - heart disease  Father - Unsure    Pt is 45 yo AAM with the above medical issues. Is here for recheck on BP.  On last visit was started on norvasc 5 mg PO q daily. Pt is tolerating medication well.  No chest pain no headaches no dizziness no edema. Also saw Cardiologist recently and is doing well.  Is also take Dyazide and toprol XL for hypertension.  Has not tried DASH diet yet.  No other issues today. BP is not at goal.  Has yet to get BP machine     The following portions of the patient's history were reviewed and updated as appropriate: allergies, current medications, past family history, past medical history, past social history, past surgical history and problem list.    Review of Systems   Constitutional: Negative.    HENT: Negative.    Eyes: Negative.    Respiratory: Negative.    Cardiovascular: Negative.    Gastrointestinal: Negative.    Endocrine: Negative.    Genitourinary: Negative.    Musculoskeletal: Negative.    Skin: Negative.    Allergic/Immunologic: Negative.    Neurological: Negative.    Hematological: Negative.    Psychiatric/Behavioral: Negative.        Objective    /92  Pulse 64  Temp 98.6 °F (37 °C)  Resp 16  Ht 71\" (180.3 cm)  Wt 223 lb 9.6 oz (101 kg)  BMI 31.19 kg/m2      Chemistry        Component Value Date/Time     (L) 04/28/2017 0607    K 3.7 04/28/2017 0607    CL 99 04/28/2017 0607    CO2 28.0 04/28/2017 0607    BUN 11 04/28/2017 0607    CREATININE 0.85 04/28/2017 0607        Component Value Date/Time    CALCIUM 9.2 04/28/2017 0607    ALKPHOS 108 04/26/2017 1557    AST 71 (H) 04/26/2017 1557    ALT 41 04/26/2017 1557    BILITOT 0.7 04/26/2017 1557        Lab Results   Component Value Date    WBC " 10.13 (H) 04/27/2017    HGB 14.3 04/27/2017    HCT 40.9 04/27/2017    MCV 81.2 04/27/2017     04/27/2017     Admission on 04/26/2017, Discharged on 04/28/2017   Component Date Value Ref Range Status   •  CV ECHO RAJENDRA - RVDD 04/27/2017 3.0  cm Preliminary   • IVSd 04/27/2017 1.6  cm Preliminary   • LVIDd 04/27/2017 4.3  cm Preliminary   • LVIDs 04/27/2017 2.2  cm Preliminary   • LVPWd 04/27/2017 1.4  cm Preliminary   • IVS/LVPW 04/27/2017 1.2   Preliminary   • FS 04/27/2017 48.8  % Preliminary   • EDV(Teich) 04/27/2017 84.8  ml Preliminary   • ESV(Teich) 04/27/2017 16.6  ml Preliminary   • EF(Teich) 04/27/2017 80.5  % Preliminary   • EDV(cubed) 04/27/2017 81.6  ml Preliminary   • ESV(cubed) 04/27/2017 10.9  ml Preliminary   • EF(cubed) 04/27/2017 86.6  % Preliminary   • LV mass(C)d 04/27/2017 268.2  grams Preliminary   • SV(Teich) 04/27/2017 68.2  ml Preliminary   • SV(cubed) 04/27/2017 70.6  ml Preliminary   • Ao root diam 04/27/2017 3.9  cm Preliminary   • Ao root area 04/27/2017 11.7  cm^2 Preliminary   • ACS 04/27/2017 2.5  cm Preliminary   • LA dimension 04/27/2017 3.5  cm Preliminary   • LA/Ao 04/27/2017 0.9   Preliminary   • LVOT diam 04/27/2017 2.3  cm Preliminary   • LVOT area 04/27/2017 4.1  cm^2 Preliminary   • LVOT area(traced) 04/27/2017 4.2  cm^2 Preliminary   • MV E max la 04/27/2017 54.4  cm/sec Preliminary   • MV A max la 04/27/2017 87.0  cm/sec Preliminary   • MV E/A 04/27/2017 0.63   Preliminary   • MV V2 max 04/27/2017 145.4  cm/sec Preliminary   • MV max PG 04/27/2017 8.5  mmHg Preliminary   • MV V2 mean 04/27/2017 79.3  cm/sec Preliminary   • MV mean PG 04/27/2017 3.2  mmHg Preliminary   • MV V2 VTI 04/27/2017 23.7  cm Preliminary   • MVA(VTI) 04/27/2017 4.2  cm^2 Preliminary   • MV P1/2t max la 04/27/2017 64.6  cm/sec Preliminary   • MV P1/2t 04/27/2017 46.2  msec Preliminary   • MVA(P1/2t) 04/27/2017 4.8  cm^2 Preliminary   • MV dec slope 04/27/2017 408.9  cm/sec^2 Preliminary   •  Ao pk la 04/27/2017 145.2  cm/sec Preliminary   • Ao max PG 04/27/2017 8.4  mmHg Preliminary   • Ao max PG (full) 04/27/2017 0.47  mmHg Preliminary   • Ao V2 mean 04/27/2017 116.7  cm/sec Preliminary   • Ao mean PG 04/27/2017 5.8  mmHg Preliminary   • Ao mean PG (full) 04/27/2017 1.5  mmHg Preliminary   • Ao V2 VTI 04/27/2017 23.8  cm Preliminary   • ALHAJI(I,A) 04/27/2017 4.2  cm^2 Preliminary   • ALHAJI(I,D) 04/27/2017 4.2  cm^2 Preliminary   • ALHAJI(V,A) 04/27/2017 4.0  cm^2 Preliminary   • ALHAJI(V,D) 04/27/2017 4.0  cm^2 Preliminary   • LV V1 max PG 04/27/2017 8.0  mmHg Preliminary   • LV V1 mean PG 04/27/2017 4.3  mmHg Preliminary   • LV V1 max 04/27/2017 141.1  cm/sec Preliminary   • LV V1 mean 04/27/2017 95.3  cm/sec Preliminary   • LV V1 VTI 04/27/2017 24.2  cm Preliminary   • SV(Ao) 04/27/2017 279.8  ml Preliminary   • SV(LVOT) 04/27/2017 100.0  ml Preliminary   • PA V2 max 04/27/2017 119.4  cm/sec Preliminary   • PA max PG 04/27/2017 5.7  mmHg Preliminary   • TR max la 04/27/2017 193.0  cm/sec Preliminary   • RVSP(TR) 04/27/2017 19.9  mmHg Preliminary   • RAP systole 04/27/2017 5.0  mmHg Preliminary   • MVA P1/2T LCG 04/27/2017 3.4  cm^2 Preliminary   • Troponin I 04/26/2017 10.200* <=0.034 ng/mL Final   • Troponin I 04/26/2017 9.430* <=0.034 ng/mL Final   • Creatine Kinase 04/26/2017 544* 55 - 170 U/L Final   • CKMB 04/26/2017 19.50* 0.00 - 5.00 ng/mL Final   • Glucose 04/26/2017 116* 60 - 100 mg/dL Final   • BUN 04/26/2017 9  7 - 21 mg/dL Final   • Creatinine 04/26/2017 0.66* 0.70 - 1.30 mg/dL Final   • Sodium 04/26/2017 135* 137 - 145 mmol/L Final   • Potassium 04/26/2017 3.6  3.5 - 5.1 mmol/L Final   • Chloride 04/26/2017 101  95 - 110 mmol/L Final   • CO2 04/26/2017 23.0  22.0 - 31.0 mmol/L Final   • Calcium 04/26/2017 9.3  8.4 - 10.2 mg/dL Final   • Total Protein 04/26/2017 7.3  6.3 - 8.6 g/dL Final   • Albumin 04/26/2017 4.30  3.40 - 4.80 g/dL Final   • ALT (SGPT) 04/26/2017 41  21 - 72 U/L Final   • AST  (SGOT) 04/26/2017 71* 17 - 59 U/L Final   • Alkaline Phosphatase 04/26/2017 108  38 - 126 U/L Final   • Total Bilirubin 04/26/2017 0.7  0.2 - 1.3 mg/dL Final   • eGFR   Amer 04/26/2017 158* 63 - 147 mL/min/1.73 Final   • Globulin 04/26/2017 3.0  2.3 - 3.5 gm/dL Final   • A/G Ratio 04/26/2017 1.4  1.1 - 1.8 g/dL Final   • BUN/Creatinine Ratio 04/26/2017 13.6  7.0 - 25.0 Final   • Anion Gap 04/26/2017 11.0  5.0 - 15.0 mmol/L Final   • WBC 04/26/2017 8.25  3.20 - 9.80 10*3/mm3 Final   • RBC 04/26/2017 5.41  4.37 - 5.74 10*6/mm3 Final   • Hemoglobin 04/26/2017 15.5  13.7 - 17.3 g/dL Final   • Hematocrit 04/26/2017 43.6  39.0 - 49.0 % Final   • MCV 04/26/2017 80.6  80.0 - 98.0 fL Final   • MCH 04/26/2017 28.7  26.5 - 34.0 pg Final   • MCHC 04/26/2017 35.6  31.5 - 36.3 g/dL Final   • RDW 04/26/2017 14.1  11.5 - 14.5 % Final   • RDW-SD 04/26/2017 41.5  35.1 - 43.9 fl Final   • MPV 04/26/2017 10.1  8.0 - 12.0 fL Final   • Platelets 04/26/2017 230  150 - 450 10*3/mm3 Final   • Total Cholesterol 04/26/2017 178  0 - 199 mg/dL Final   • Triglycerides 04/26/2017 73  20 - 199 mg/dL Final   • HDL Cholesterol 04/26/2017 50* 60 - 200 mg/dL Final   • LDL Cholesterol  04/26/2017 111  1 - 129 mg/dL Final   • LDL/HDL Ratio 04/26/2017 2.27  0.00 - 3.55 Final   • Troponin I 04/27/2017 5.240* <=0.034 ng/mL Final   • WBC 04/27/2017 10.13* 3.20 - 9.80 10*3/mm3 Final   • RBC 04/27/2017 5.04  4.37 - 5.74 10*6/mm3 Final   • Hemoglobin 04/27/2017 14.3  13.7 - 17.3 g/dL Final   • Hematocrit 04/27/2017 40.9  39.0 - 49.0 % Final   • MCV 04/27/2017 81.2  80.0 - 98.0 fL Final   • MCH 04/27/2017 28.4  26.5 - 34.0 pg Final   • MCHC 04/27/2017 35.0  31.5 - 36.3 g/dL Final   • RDW 04/27/2017 14.0  11.5 - 14.5 % Final   • RDW-SD 04/27/2017 41.6  35.1 - 43.9 fl Final   • MPV 04/27/2017 10.1  8.0 - 12.0 fL Final   • Platelets 04/27/2017 212  150 - 450 10*3/mm3 Final   • Glucose 04/27/2017 116* 60 - 100 mg/dL Final   • BUN 04/27/2017 12  7 - 21 mg/dL  Final   • Creatinine 04/27/2017 0.76  0.70 - 1.30 mg/dL Final   • Sodium 04/27/2017 137  137 - 145 mmol/L Final   • Potassium 04/27/2017 3.5  3.5 - 5.1 mmol/L Final   • Chloride 04/27/2017 103  95 - 110 mmol/L Final   • CO2 04/27/2017 23.0  22.0 - 31.0 mmol/L Final   • Calcium 04/27/2017 9.3  8.4 - 10.2 mg/dL Final   • eGFR   Amer 04/27/2017 134  63 - 147 mL/min/1.73 Final   • BUN/Creatinine Ratio 04/27/2017 15.8  7.0 - 25.0 Final   • Anion Gap 04/27/2017 11.0  5.0 - 15.0 mmol/L Final   • Hemoglobin A1C 04/27/2017 5.75* 4 - 5.6 % Final   • Glucose 04/28/2017 128* 60 - 100 mg/dL Final   • BUN 04/28/2017 11  7 - 21 mg/dL Final   • Creatinine 04/28/2017 0.85  0.70 - 1.30 mg/dL Final   • Sodium 04/28/2017 136* 137 - 145 mmol/L Final   • Potassium 04/28/2017 3.7  3.5 - 5.1 mmol/L Final   • Chloride 04/28/2017 99  95 - 110 mmol/L Final   • CO2 04/28/2017 28.0  22.0 - 31.0 mmol/L Final   • Calcium 04/28/2017 9.2  8.4 - 10.2 mg/dL Final   • eGFR   Amer 04/28/2017 118  63 - 147 mL/min/1.73 Final   • BUN/Creatinine Ratio 04/28/2017 12.9  7.0 - 25.0 Final   • Anion Gap 04/28/2017 9.0  5.0 - 15.0 mmol/L Final   • Magnesium 04/28/2017 2.0  1.6 - 2.3 mg/dL Final   • Extra Tube 04/28/2017 hold for add-on   Final    Auto resulted       Physical Exam   Constitutional: He is oriented to person, place, and time. He appears well-developed and well-nourished. No distress.   HENT:   Head: Normocephalic and atraumatic.   Right Ear: External ear normal.   Left Ear: External ear normal.   Eyes: EOM are normal. Pupils are equal, round, and reactive to light. Right eye exhibits no discharge. Left eye exhibits no discharge. No scleral icterus.   Neck: Normal range of motion. Neck supple. No JVD present. No tracheal deviation present. No thyromegaly present.   Cardiovascular: Normal rate, regular rhythm and normal heart sounds.    Pulmonary/Chest: Effort normal and breath sounds normal. No stridor. No respiratory distress. He has  no wheezes.   Abdominal: Soft. Bowel sounds are normal. He exhibits no distension and no mass. There is no tenderness. There is no rebound and no guarding. No hernia.   Obese abdomen    Musculoskeletal: Normal range of motion. He exhibits no edema or tenderness.   Lymphadenopathy:     He has no cervical adenopathy.   Neurological: He is alert and oriented to person, place, and time. He has normal reflexes. No cranial nerve deficit. Coordination normal.   Skin: Skin is warm and dry. No rash noted. He is not diaphoretic. No erythema. No pallor.   Psychiatric: He has a normal mood and affect. His behavior is normal. Thought content normal.   Nursing note and vitals reviewed.      Assessment/Plan   Problems Addressed this Visit        Cardiovascular and Mediastinum    Essential hypertension - Primary    Relevant Medications    amLODIPine (NORVASC) 10 MG tablet       Digestive    Obesity        - obesity - DASH diet,  Counseled on weight loss diet and exercise  - Essential hypertension - BP not at goal.  Will continue dyazide along with metoprolol 50 mg PO q daily and losartan 100 mg PO q daily. Will go up on norvasc from 5 to 10 mg PO q daily. Advised pt to check BP at home and bring on next visit  - recheck in 1 month

## 2017-10-27 RX ORDER — METOPROLOL SUCCINATE 50 MG/1
50 TABLET, EXTENDED RELEASE ORAL DAILY
Qty: 30 TABLET | Refills: 6 | Status: SHIPPED | OUTPATIENT
Start: 2017-10-27 | End: 2017-10-31 | Stop reason: SDUPTHER

## 2017-10-27 RX ORDER — ISOSORBIDE MONONITRATE 60 MG/1
TABLET, EXTENDED RELEASE ORAL
Qty: 30 TABLET | Refills: 4 | Status: SHIPPED | OUTPATIENT
Start: 2017-10-27 | End: 2017-10-31 | Stop reason: SDUPTHER

## 2017-10-27 RX ORDER — LOSARTAN POTASSIUM 100 MG/1
TABLET ORAL
Qty: 30 TABLET | Refills: 4 | Status: SHIPPED | OUTPATIENT
Start: 2017-10-27 | End: 2017-10-31 | Stop reason: SDUPTHER

## 2017-10-27 RX ORDER — TRIAMTERENE AND HYDROCHLOROTHIAZIDE 37.5; 25 MG/1; MG/1
CAPSULE ORAL
Qty: 30 CAPSULE | Refills: 4 | Status: SHIPPED | OUTPATIENT
Start: 2017-10-27 | End: 2017-10-31 | Stop reason: SDUPTHER

## 2017-10-31 RX ORDER — ISOSORBIDE MONONITRATE 60 MG/1
60 TABLET, EXTENDED RELEASE ORAL DAILY
Qty: 30 TABLET | Refills: 4 | Status: SHIPPED | OUTPATIENT
Start: 2017-10-31 | End: 2018-07-11 | Stop reason: SDUPTHER

## 2017-10-31 RX ORDER — TRIAMTERENE AND HYDROCHLOROTHIAZIDE 37.5; 25 MG/1; MG/1
1 CAPSULE ORAL DAILY
Qty: 30 CAPSULE | Refills: 4 | Status: SHIPPED | OUTPATIENT
Start: 2017-10-31 | End: 2018-07-11 | Stop reason: SDUPTHER

## 2017-10-31 RX ORDER — METOPROLOL SUCCINATE 50 MG/1
50 TABLET, EXTENDED RELEASE ORAL DAILY
Qty: 30 TABLET | Refills: 4 | Status: SHIPPED | OUTPATIENT
Start: 2017-10-31 | End: 2017-11-02 | Stop reason: SDUPTHER

## 2017-10-31 RX ORDER — LOSARTAN POTASSIUM 100 MG/1
100 TABLET ORAL DAILY
Qty: 30 TABLET | Refills: 4 | Status: SHIPPED | OUTPATIENT
Start: 2017-10-31 | End: 2018-07-11 | Stop reason: SDUPTHER

## 2017-11-01 RX ORDER — ATORVASTATIN CALCIUM 40 MG/1
40 TABLET, FILM COATED ORAL NIGHTLY
Qty: 30 TABLET | Refills: 6 | Status: SHIPPED | OUTPATIENT
Start: 2017-11-01 | End: 2018-07-11 | Stop reason: SDUPTHER

## 2017-11-01 RX ORDER — CLOPIDOGREL BISULFATE 75 MG/1
75 TABLET ORAL DAILY
Qty: 30 TABLET | Refills: 11 | Status: SHIPPED | OUTPATIENT
Start: 2017-11-01 | End: 2018-07-11 | Stop reason: SDUPTHER

## 2017-11-01 RX ORDER — ATORVASTATIN CALCIUM 40 MG/1
20 TABLET, FILM COATED ORAL NIGHTLY
Qty: 30 TABLET | Refills: 6 | Status: SHIPPED | OUTPATIENT
Start: 2017-11-01 | End: 2017-11-01

## 2017-11-02 ENCOUNTER — TELEPHONE (OUTPATIENT)
Dept: FAMILY MEDICINE CLINIC | Facility: CLINIC | Age: 47
End: 2017-11-02

## 2017-11-02 RX ORDER — METOPROLOL SUCCINATE 50 MG/1
50 TABLET, EXTENDED RELEASE ORAL DAILY
Qty: 30 TABLET | Refills: 4 | Status: SHIPPED | OUTPATIENT
Start: 2017-11-02 | End: 2018-07-11 | Stop reason: SDUPTHER

## 2018-01-04 DIAGNOSIS — I25.10 CORONARY ARTERY DISEASE INVOLVING NATIVE HEART WITHOUT ANGINA PECTORIS, UNSPECIFIED VESSEL OR LESION TYPE: Primary | ICD-10-CM

## 2018-07-05 RX ORDER — AMLODIPINE BESYLATE 10 MG/1
TABLET ORAL
Qty: 30 TABLET | Refills: 10 | OUTPATIENT
Start: 2018-07-05

## 2018-07-10 RX ORDER — ATORVASTATIN CALCIUM 40 MG/1
TABLET, FILM COATED ORAL
Qty: 30 TABLET | Refills: 5 | OUTPATIENT
Start: 2018-07-10

## 2018-07-10 NOTE — PROGRESS NOTES
Subjective   Sourav Moreno is a 47 y.o. male.     Problem List  1. Previous MI sp cardiac catherization/PCI sp stent one Drug eluting stent in LAD  2. Hyperlipidemia ASCVD risk high   3. Prediabetes   4. Essential Hypertension   5. Obesity  BMI >30      Family Hx  Mother - heart disease  Father - Unsure    6/20/18 Pt is 45 yo AAM with the above medical issues. Is here for recheck on BP.  On last visit was started on norvasc 5 mg PO q daily. Pt is tolerating medication well.  No chest pain no headaches no dizziness no edema. Also saw Cardiologist recently and is doing well.  Is also take Dyazide and toprol XL for hypertension.  Has not tried DASH diet yet.  No other issues today. BP is not at goal.  Has yet to get BP machine     7/11/18 pt is here for followup and recheck I have not seen pt since June 2017. He also has history of MI and hast one stent and missed his appt with Cardiologist in January 2018. He had MI last year  For his CAD/history of MI. He is taking losartan 100 mg Po q daily and Toprol XL 50 mg q daily along with aspirin 325 mg po q daily, plavix 75 mg Po q dialy and imdur 60 mg daily and lipitro 40 mg PO qhs.  For hypertension he is on losartan, Toprol XL norvasc, Dyazide 37.525 mg daily. He is due for new labwork to check lipid panel, CBC, cmp, hga1c for prediabetes and CMP.  His last hga1c was 5.75. He denies any chest pain, dizziness or shortness of breath currently. BP is slghtly elevated today. He has been out of his medication for a few days now.        Coronary Artery Disease   Presents for follow-up visit. Pertinent negatives include no chest pain, chest pressure, chest tightness, dizziness, leg swelling, muscle weakness, palpitations, shortness of breath or weight gain. The symptoms have been stable. Compliance with diet is good. Compliance with exercise is good. Compliance with medications is good.   Hypertension   This is a chronic problem. The current episode started more than 1 year ago.  The problem is unchanged. The problem is controlled. Pertinent negatives include no anxiety, blurred vision, chest pain, headaches, malaise/fatigue, neck pain, orthopnea, palpitations, peripheral edema, PND, shortness of breath or sweats. There are no associated agents to hypertension. Risk factors for coronary artery disease include male gender, obesity and sedentary lifestyle. Past treatments include beta blockers and angiotensin blockers. Current antihypertension treatment includes angiotensin blockers and beta blockers. There are no compliance problems.  There is no history of angina, kidney disease, CAD/MI, CVA, heart failure, left ventricular hypertrophy, PVD or retinopathy. There is no history of chronic renal disease, coarctation of the aorta, hyperaldosteronism, hypercortisolism, hyperparathyroidism, a hypertension causing med, pheochromocytoma, renovascular disease, sleep apnea or a thyroid problem.          The following portions of the patient's history were reviewed and updated as appropriate: allergies, current medications, past family history, past medical history, past social history, past surgical history and problem list.  Patient Active Problem List   Diagnosis   • Acute MI anterior wall first episode care (CMS/[facility])   • Hyperlipidemia   • Essential hypertension   • S/P angioplasty with stent   • Coronary artery disease involving native heart with angina pectoris (CMS/[facility])   • Coronary artery disease involving native coronary artery of native heart without angina pectoris   • Obesity   • Encounter for screening for endocrine disorder   • Prediabetes     Current Outpatient Prescriptions on File Prior to Visit   Medication Sig Dispense Refill   • amLODIPine (NORVASC) 10 MG tablet Take 1 tablet by mouth Daily. 30 tablet 11   • aspirin  MG EC tablet Take 1 tablet by mouth Daily. 30 tablet 11   • atorvastatin (LIPITOR) 40 MG tablet Take 1 tablet by mouth Every Night. 30 tablet 6   • clopidogrel  "(PLAVIX) 75 MG tablet Take 1 tablet by mouth Daily. 30 tablet 11   • isosorbide mononitrate (IMDUR) 60 MG 24 hr tablet Take 1 tablet by mouth Daily. 30 tablet 4   • losartan (COZAAR) 100 MG tablet Take 1 tablet by mouth Daily. 30 tablet 4   • metoprolol succinate XL (TOPROL-XL) 50 MG 24 hr tablet Take 1 tablet by mouth Daily. Pt reports allergy to this medication- no refill x345 days per pharmacy 30 tablet 4   • triamterene-hydrochlorothiazide (DYAZIDE) 37.5-25 MG per capsule Take 1 capsule by mouth Daily. 30 capsule 4     No current facility-administered medications on file prior to visit.        Review of Systems   Constitutional: Negative.  Negative for malaise/fatigue and weight gain.   HENT: Negative.    Eyes: Negative.  Negative for blurred vision.   Respiratory: Negative.  Negative for chest tightness and shortness of breath.    Cardiovascular: Negative for chest pain, palpitations, orthopnea, leg swelling and PND.   Gastrointestinal: Negative.    Endocrine: Negative.    Genitourinary: Negative.    Musculoskeletal: Negative.  Negative for muscle weakness and neck pain.   Skin: Negative.    Allergic/Immunologic: Negative.    Neurological: Negative.  Negative for dizziness and headaches.   Hematological: Negative.    Psychiatric/Behavioral: Negative.        Objective    /98   Pulse 62   Temp 98.6 °F (37 °C)   Ht 180.3 cm (71\")   Wt 99.2 kg (218 lb 9.6 oz)   SpO2 98%   BMI 30.49 kg/m²           Chemistry        Component Value Date/Time     (L) 04/28/2017 0607    K 3.7 04/28/2017 0607    CL 99 04/28/2017 0607    CO2 28.0 04/28/2017 0607    BUN 11 04/28/2017 0607    CREATININE 0.85 04/28/2017 0607        Component Value Date/Time    CALCIUM 9.2 04/28/2017 0607    ALKPHOS 108 04/26/2017 1557    AST 71 (H) 04/26/2017 1557    ALT 41 04/26/2017 1557    BILITOT 0.7 04/26/2017 1557        Lab Results   Component Value Date    WBC 10.13 (H) 04/27/2017    HGB 14.3 04/27/2017    HCT 40.9 04/27/2017    " MCV 81.2 04/27/2017     04/27/2017     Admission on 04/26/2017, Discharged on 04/28/2017   Component Date Value Ref Range Status   •  CV ECHO RAJENDRA - RVDD 04/27/2017 3.0  cm Preliminary   • IVSd 04/27/2017 1.6  cm Preliminary   • LVIDd 04/27/2017 4.3  cm Preliminary   • LVIDs 04/27/2017 2.2  cm Preliminary   • LVPWd 04/27/2017 1.4  cm Preliminary   • IVS/LVPW 04/27/2017 1.2   Preliminary   • FS 04/27/2017 48.8  % Preliminary   • EDV(Teich) 04/27/2017 84.8  ml Preliminary   • ESV(Teich) 04/27/2017 16.6  ml Preliminary   • EF(Teich) 04/27/2017 80.5  % Preliminary   • EDV(cubed) 04/27/2017 81.6  ml Preliminary   • ESV(cubed) 04/27/2017 10.9  ml Preliminary   • EF(cubed) 04/27/2017 86.6  % Preliminary   • LV mass(C)d 04/27/2017 268.2  grams Preliminary   • SV(Teich) 04/27/2017 68.2  ml Preliminary   • SV(cubed) 04/27/2017 70.6  ml Preliminary   • Ao root diam 04/27/2017 3.9  cm Preliminary   • Ao root area 04/27/2017 11.7  cm^2 Preliminary   • ACS 04/27/2017 2.5  cm Preliminary   • LA dimension 04/27/2017 3.5  cm Preliminary   • LA/Ao 04/27/2017 0.9   Preliminary   • LVOT diam 04/27/2017 2.3  cm Preliminary   • LVOT area 04/27/2017 4.1  cm^2 Preliminary   • LVOT area(traced) 04/27/2017 4.2  cm^2 Preliminary   • MV E max la 04/27/2017 54.4  cm/sec Preliminary   • MV A max la 04/27/2017 87.0  cm/sec Preliminary   • MV E/A 04/27/2017 0.63   Preliminary   • MV V2 max 04/27/2017 145.4  cm/sec Preliminary   • MV max PG 04/27/2017 8.5  mmHg Preliminary   • MV V2 mean 04/27/2017 79.3  cm/sec Preliminary   • MV mean PG 04/27/2017 3.2  mmHg Preliminary   • MV V2 VTI 04/27/2017 23.7  cm Preliminary   • MVA(VTI) 04/27/2017 4.2  cm^2 Preliminary   • MV P1/2t max la 04/27/2017 64.6  cm/sec Preliminary   • MV P1/2t 04/27/2017 46.2  msec Preliminary   • MVA(P1/2t) 04/27/2017 4.8  cm^2 Preliminary   • MV dec slope 04/27/2017 408.9  cm/sec^2 Preliminary   • Ao pk al 04/27/2017 145.2  cm/sec Preliminary   • Ao max PG  04/27/2017 8.4  mmHg Preliminary   • Ao max PG (full) 04/27/2017 0.47  mmHg Preliminary   • Ao V2 mean 04/27/2017 116.7  cm/sec Preliminary   • Ao mean PG 04/27/2017 5.8  mmHg Preliminary   • Ao mean PG (full) 04/27/2017 1.5  mmHg Preliminary   • Ao V2 VTI 04/27/2017 23.8  cm Preliminary   • ALHAJI(I,A) 04/27/2017 4.2  cm^2 Preliminary   • ALHAJI(I,D) 04/27/2017 4.2  cm^2 Preliminary   • ALHAJI(V,A) 04/27/2017 4.0  cm^2 Preliminary   • ALHAJI(V,D) 04/27/2017 4.0  cm^2 Preliminary   • LV V1 max PG 04/27/2017 8.0  mmHg Preliminary   • LV V1 mean PG 04/27/2017 4.3  mmHg Preliminary   • LV V1 max 04/27/2017 141.1  cm/sec Preliminary   • LV V1 mean 04/27/2017 95.3  cm/sec Preliminary   • LV V1 VTI 04/27/2017 24.2  cm Preliminary   • SV(Ao) 04/27/2017 279.8  ml Preliminary   • SV(LVOT) 04/27/2017 100.0  ml Preliminary   • PA V2 max 04/27/2017 119.4  cm/sec Preliminary   • PA max PG 04/27/2017 5.7  mmHg Preliminary   • TR max la 04/27/2017 193.0  cm/sec Preliminary   • RVSP(TR) 04/27/2017 19.9  mmHg Preliminary   • RAP systole 04/27/2017 5.0  mmHg Preliminary   • MVA P1/2T LCG 04/27/2017 3.4  cm^2 Preliminary   • Troponin I 04/26/2017 10.200* <=0.034 ng/mL Final   • Troponin I 04/26/2017 9.430* <=0.034 ng/mL Final   • Creatine Kinase 04/26/2017 544* 55 - 170 U/L Final   • CKMB 04/26/2017 19.50* 0.00 - 5.00 ng/mL Final   • Glucose 04/26/2017 116* 60 - 100 mg/dL Final   • BUN 04/26/2017 9  7 - 21 mg/dL Final   • Creatinine 04/26/2017 0.66* 0.70 - 1.30 mg/dL Final   • Sodium 04/26/2017 135* 137 - 145 mmol/L Final   • Potassium 04/26/2017 3.6  3.5 - 5.1 mmol/L Final   • Chloride 04/26/2017 101  95 - 110 mmol/L Final   • CO2 04/26/2017 23.0  22.0 - 31.0 mmol/L Final   • Calcium 04/26/2017 9.3  8.4 - 10.2 mg/dL Final   • Total Protein 04/26/2017 7.3  6.3 - 8.6 g/dL Final   • Albumin 04/26/2017 4.30  3.40 - 4.80 g/dL Final   • ALT (SGPT) 04/26/2017 41  21 - 72 U/L Final   • AST (SGOT) 04/26/2017 71* 17 - 59 U/L Final   • Alkaline  Phosphatase 04/26/2017 108  38 - 126 U/L Final   • Total Bilirubin 04/26/2017 0.7  0.2 - 1.3 mg/dL Final   • eGFR   Amer 04/26/2017 158* 63 - 147 mL/min/1.73 Final   • Globulin 04/26/2017 3.0  2.3 - 3.5 gm/dL Final   • A/G Ratio 04/26/2017 1.4  1.1 - 1.8 g/dL Final   • BUN/Creatinine Ratio 04/26/2017 13.6  7.0 - 25.0 Final   • Anion Gap 04/26/2017 11.0  5.0 - 15.0 mmol/L Final   • WBC 04/26/2017 8.25  3.20 - 9.80 10*3/mm3 Final   • RBC 04/26/2017 5.41  4.37 - 5.74 10*6/mm3 Final   • Hemoglobin 04/26/2017 15.5  13.7 - 17.3 g/dL Final   • Hematocrit 04/26/2017 43.6  39.0 - 49.0 % Final   • MCV 04/26/2017 80.6  80.0 - 98.0 fL Final   • MCH 04/26/2017 28.7  26.5 - 34.0 pg Final   • MCHC 04/26/2017 35.6  31.5 - 36.3 g/dL Final   • RDW 04/26/2017 14.1  11.5 - 14.5 % Final   • RDW-SD 04/26/2017 41.5  35.1 - 43.9 fl Final   • MPV 04/26/2017 10.1  8.0 - 12.0 fL Final   • Platelets 04/26/2017 230  150 - 450 10*3/mm3 Final   • Total Cholesterol 04/26/2017 178  0 - 199 mg/dL Final   • Triglycerides 04/26/2017 73  20 - 199 mg/dL Final   • HDL Cholesterol 04/26/2017 50* 60 - 200 mg/dL Final   • LDL Cholesterol  04/26/2017 111  1 - 129 mg/dL Final   • LDL/HDL Ratio 04/26/2017 2.27  0.00 - 3.55 Final   • Troponin I 04/27/2017 5.240* <=0.034 ng/mL Final   • WBC 04/27/2017 10.13* 3.20 - 9.80 10*3/mm3 Final   • RBC 04/27/2017 5.04  4.37 - 5.74 10*6/mm3 Final   • Hemoglobin 04/27/2017 14.3  13.7 - 17.3 g/dL Final   • Hematocrit 04/27/2017 40.9  39.0 - 49.0 % Final   • MCV 04/27/2017 81.2  80.0 - 98.0 fL Final   • MCH 04/27/2017 28.4  26.5 - 34.0 pg Final   • MCHC 04/27/2017 35.0  31.5 - 36.3 g/dL Final   • RDW 04/27/2017 14.0  11.5 - 14.5 % Final   • RDW-SD 04/27/2017 41.6  35.1 - 43.9 fl Final   • MPV 04/27/2017 10.1  8.0 - 12.0 fL Final   • Platelets 04/27/2017 212  150 - 450 10*3/mm3 Final   • Glucose 04/27/2017 116* 60 - 100 mg/dL Final   • BUN 04/27/2017 12  7 - 21 mg/dL Final   • Creatinine 04/27/2017 0.76  0.70 - 1.30  mg/dL Final   • Sodium 04/27/2017 137  137 - 145 mmol/L Final   • Potassium 04/27/2017 3.5  3.5 - 5.1 mmol/L Final   • Chloride 04/27/2017 103  95 - 110 mmol/L Final   • CO2 04/27/2017 23.0  22.0 - 31.0 mmol/L Final   • Calcium 04/27/2017 9.3  8.4 - 10.2 mg/dL Final   • eGFR   Amer 04/27/2017 134  63 - 147 mL/min/1.73 Final   • BUN/Creatinine Ratio 04/27/2017 15.8  7.0 - 25.0 Final   • Anion Gap 04/27/2017 11.0  5.0 - 15.0 mmol/L Final   • Hemoglobin A1C 04/27/2017 5.75* 4 - 5.6 % Final   • Glucose 04/28/2017 128* 60 - 100 mg/dL Final   • BUN 04/28/2017 11  7 - 21 mg/dL Final   • Creatinine 04/28/2017 0.85  0.70 - 1.30 mg/dL Final   • Sodium 04/28/2017 136* 137 - 145 mmol/L Final   • Potassium 04/28/2017 3.7  3.5 - 5.1 mmol/L Final   • Chloride 04/28/2017 99  95 - 110 mmol/L Final   • CO2 04/28/2017 28.0  22.0 - 31.0 mmol/L Final   • Calcium 04/28/2017 9.2  8.4 - 10.2 mg/dL Final   • eGFR   Amer 04/28/2017 118  63 - 147 mL/min/1.73 Final   • BUN/Creatinine Ratio 04/28/2017 12.9  7.0 - 25.0 Final   • Anion Gap 04/28/2017 9.0  5.0 - 15.0 mmol/L Final   • Magnesium 04/28/2017 2.0  1.6 - 2.3 mg/dL Final   • Extra Tube 04/28/2017 hold for add-on   Final    Auto resulted       Physical Exam   Constitutional: He is oriented to person, place, and time. He appears well-developed and well-nourished. No distress.   HENT:   Head: Normocephalic and atraumatic.   Right Ear: External ear normal.   Left Ear: External ear normal.   Eyes: EOM are normal. Pupils are equal, round, and reactive to light. Right eye exhibits no discharge. Left eye exhibits no discharge. No scleral icterus.   Neck: Normal range of motion. Neck supple. No JVD present. No tracheal deviation present. No thyromegaly present.   Cardiovascular: Normal rate, regular rhythm and normal heart sounds.    Pulmonary/Chest: Effort normal and breath sounds normal. No stridor. No respiratory distress. He has no wheezes.   Abdominal: Soft. Bowel sounds are  normal. He exhibits no distension and no mass. There is no tenderness. There is no rebound and no guarding. No hernia.   Obese abdomen    Musculoskeletal: Normal range of motion. He exhibits no edema or tenderness.   Lymphadenopathy:     He has no cervical adenopathy.   Neurological: He is alert and oriented to person, place, and time. He has normal reflexes. No cranial nerve deficit. Coordination normal.   Skin: Skin is warm and dry. No rash noted. He is not diaphoretic. No erythema. No pallor.   Psychiatric: He has a normal mood and affect. His behavior is normal. Thought content normal.   Nursing note and vitals reviewed.      Assessment/Plan   Problems Addressed this Visit        Cardiovascular and Mediastinum    Acute MI anterior wall first episode care (CMS/[facility]) - Primary    Hyperlipidemia    Essential hypertension    Relevant Orders    CBC Auto Differential    Comprehensive Metabolic Panel    Coronary artery disease involving native heart with angina pectoris (CMS/[facility])    Coronary artery disease involving native coronary artery of native heart without angina pectoris       Digestive    Obesity    Relevant Orders    Vitamin D 25 Hydroxy       Other    S/P angioplasty with stent    Encounter for screening for endocrine disorder    Relevant Orders    TSH    T4, Free    T3, Free    Prediabetes    Relevant Orders    Hemoglobin A1c      Other Visit Diagnoses     General medical examination        Relevant Orders    CBC Auto Differential    Hemoglobin A1c    Comprehensive Metabolic Panel    Lipid Panel    TSH    T4, Free    T3, Free    Vitamin D 25 Hydroxy    Annual physical exam            -annual physical exam today   - will get cbc, cmp, lipid panel, thyroid studies, vitamin D and hga1c   - obesity - DASH diet,  Counseled on weight loss diet and exercise  - Essential hypertension - BP not at goal.  Will continue dyazide along with metoprolol 50 mg PO q daily and losartan 100 mg PO q daily. Will go up on norvasc  from 5 to 10 mg PO q daily. Advised pt to check BP at home and bring on next visit  - prediabetes - recheck hga1c  -recheck in 2 weeks for BP recheck and go over labwork   -for History of MI/CAD/sp stent - Pt missed last appt with Cardiologist. He was advised to call for appt. nubmer was provided to reschedule  - advised pt to be safe and call with any questions or concerns. All questions addressed today        This document has been electronically signed by Jerry Zendejas MD on July 11, 2018 11:08 AM               Review of Systems   Constitutional: Negative.  Negative for malaise/fatigue and unexpected weight gain.   HENT: Negative.    Eyes: Negative.  Negative for blurred vision.   Respiratory: Negative.  Negative for chest tightness and shortness of breath.    Cardiovascular: Negative for chest pain, palpitations, orthopnea, leg swelling and PND.   Gastrointestinal: Negative.    Endocrine: Negative.    Genitourinary: Negative.    Musculoskeletal: Negative.  Negative for muscle weakness and neck pain.   Skin: Negative.    Allergic/Immunologic: Negative.    Neurological: Negative.  Negative for dizziness.   Hematological: Negative.    Psychiatric/Behavioral: Negative.        Physical Exam   Constitutional: He is oriented to person, place, and time. He appears well-developed and well-nourished. No distress.   HENT:   Head: Normocephalic and atraumatic.   Right Ear: External ear normal.   Left Ear: External ear normal.   Eyes: EOM are normal. Pupils are equal, round, and reactive to light. Right eye exhibits no discharge. Left eye exhibits no discharge. No scleral icterus.   Neck: Normal range of motion. Neck supple. No JVD present. No tracheal deviation present. No thyromegaly present.   Cardiovascular: Normal rate, regular rhythm and normal heart sounds.    Pulmonary/Chest: Effort normal and breath sounds normal. No stridor. No respiratory distress. He has no wheezes.   Abdominal: Soft. Bowel sounds are normal.  He exhibits no distension and no mass. There is no tenderness. There is no rebound and no guarding. No hernia.   Obese abdomen    Musculoskeletal: Normal range of motion. He exhibits no edema or tenderness.   Lymphadenopathy:     He has no cervical adenopathy.   Neurological: He is alert and oriented to person, place, and time. He has normal reflexes. No cranial nerve deficit. Coordination normal.   Skin: Skin is warm and dry. No rash noted. He is not diaphoretic. No erythema. No pallor.   Psychiatric: He has a normal mood and affect. His behavior is normal. Thought content normal.   Nursing note and vitals reviewed.

## 2018-07-11 ENCOUNTER — OFFICE VISIT (OUTPATIENT)
Dept: FAMILY MEDICINE CLINIC | Facility: CLINIC | Age: 48
End: 2018-07-11

## 2018-07-11 VITALS
WEIGHT: 218.6 LBS | SYSTOLIC BLOOD PRESSURE: 130 MMHG | HEART RATE: 62 BPM | OXYGEN SATURATION: 98 % | TEMPERATURE: 98.6 F | BODY MASS INDEX: 30.6 KG/M2 | DIASTOLIC BLOOD PRESSURE: 98 MMHG | HEIGHT: 71 IN

## 2018-07-11 DIAGNOSIS — I21.09 ACUTE MI ANTERIOR WALL FIRST EPISODE CARE (HCC): Primary | ICD-10-CM

## 2018-07-11 DIAGNOSIS — Z13.29 ENCOUNTER FOR SCREENING FOR ENDOCRINE DISORDER: ICD-10-CM

## 2018-07-11 DIAGNOSIS — I10 ESSENTIAL HYPERTENSION: ICD-10-CM

## 2018-07-11 DIAGNOSIS — Z95.820 S/P ANGIOPLASTY WITH STENT: ICD-10-CM

## 2018-07-11 DIAGNOSIS — R73.03 PREDIABETES: ICD-10-CM

## 2018-07-11 DIAGNOSIS — I25.119 CORONARY ARTERY DISEASE INVOLVING NATIVE HEART WITH ANGINA PECTORIS, UNSPECIFIED VESSEL OR LESION TYPE (HCC): ICD-10-CM

## 2018-07-11 DIAGNOSIS — Z00.00 GENERAL MEDICAL EXAMINATION: ICD-10-CM

## 2018-07-11 DIAGNOSIS — E78.5 HYPERLIPIDEMIA, UNSPECIFIED HYPERLIPIDEMIA TYPE: ICD-10-CM

## 2018-07-11 DIAGNOSIS — E66.9 OBESITY, UNSPECIFIED CLASSIFICATION, UNSPECIFIED OBESITY TYPE, UNSPECIFIED WHETHER SERIOUS COMORBIDITY PRESENT: ICD-10-CM

## 2018-07-11 DIAGNOSIS — Z00.00 ANNUAL PHYSICAL EXAM: ICD-10-CM

## 2018-07-11 DIAGNOSIS — I25.10 CORONARY ARTERY DISEASE INVOLVING NATIVE CORONARY ARTERY OF NATIVE HEART WITHOUT ANGINA PECTORIS: ICD-10-CM

## 2018-07-11 PROCEDURE — 99214 OFFICE O/P EST MOD 30 MIN: CPT | Performed by: FAMILY MEDICINE

## 2018-07-11 RX ORDER — TRIAMTERENE AND HYDROCHLOROTHIAZIDE 37.5; 25 MG/1; MG/1
1 CAPSULE ORAL DAILY
Qty: 30 CAPSULE | Refills: 3 | Status: SHIPPED | OUTPATIENT
Start: 2018-07-11 | End: 2019-01-03 | Stop reason: SDUPTHER

## 2018-07-11 RX ORDER — AMLODIPINE BESYLATE 10 MG/1
10 TABLET ORAL DAILY
Qty: 30 TABLET | Refills: 3 | Status: SHIPPED | OUTPATIENT
Start: 2018-07-11 | End: 2018-11-07 | Stop reason: SDUPTHER

## 2018-07-11 RX ORDER — METOPROLOL SUCCINATE 50 MG/1
50 TABLET, EXTENDED RELEASE ORAL DAILY
Qty: 30 TABLET | Refills: 3 | Status: SHIPPED | OUTPATIENT
Start: 2018-07-11 | End: 2018-07-11 | Stop reason: SDUPTHER

## 2018-07-11 RX ORDER — ISOSORBIDE MONONITRATE 60 MG/1
60 TABLET, EXTENDED RELEASE ORAL DAILY
Qty: 30 TABLET | Refills: 3 | Status: SHIPPED | OUTPATIENT
Start: 2018-07-11 | End: 2019-01-03 | Stop reason: SDUPTHER

## 2018-07-11 RX ORDER — METOPROLOL SUCCINATE 50 MG/1
50 TABLET, EXTENDED RELEASE ORAL DAILY
Qty: 30 TABLET | Refills: 3 | Status: SHIPPED | OUTPATIENT
Start: 2018-07-11 | End: 2019-01-08 | Stop reason: SDUPTHER

## 2018-07-11 RX ORDER — LOSARTAN POTASSIUM 100 MG/1
100 TABLET ORAL DAILY
Qty: 30 TABLET | Refills: 3 | Status: SHIPPED | OUTPATIENT
Start: 2018-07-11 | End: 2019-01-08 | Stop reason: SDUPTHER

## 2018-07-11 RX ORDER — ATORVASTATIN CALCIUM 40 MG/1
40 TABLET, FILM COATED ORAL NIGHTLY
Qty: 30 TABLET | Refills: 3 | Status: SHIPPED | OUTPATIENT
Start: 2018-07-11 | End: 2018-11-07 | Stop reason: SDUPTHER

## 2018-07-11 RX ORDER — CLOPIDOGREL BISULFATE 75 MG/1
75 TABLET ORAL DAILY
Qty: 30 TABLET | Refills: 3 | Status: SHIPPED | OUTPATIENT
Start: 2018-07-11 | End: 2019-03-09 | Stop reason: SDUPTHER

## 2018-07-11 NOTE — PATIENT INSTRUCTIONS
1 Get new labs fasting soon. At least 8 hours prior to getting labwork  2. Call Dr. Handley office for [facility]  Heart doctor   3.  Recheck in 2 weeks

## 2018-07-12 LAB
25(OH)D3 SERPL-MCNC: 32.8 NG/ML (ref 30–100)
ALBUMIN SERPL-MCNC: 4.3 G/DL (ref 3.4–4.8)
ALBUMIN/GLOB SERPL: 1.4 G/DL (ref 1.1–1.8)
ALP SERPL-CCNC: 118 U/L (ref 38–126)
ALT SERPL W P-5'-P-CCNC: 55 U/L (ref 21–72)
ANION GAP SERPL CALCULATED.3IONS-SCNC: 9 MMOL/L (ref 5–15)
ARTICHOKE IGE QN: 57 MG/DL (ref 1–129)
AST SERPL-CCNC: 37 U/L (ref 17–59)
BASOPHILS # BLD AUTO: 0.01 10*3/MM3 (ref 0–0.2)
BASOPHILS NFR BLD AUTO: 0.2 % (ref 0–2)
BILIRUB SERPL-MCNC: 0.5 MG/DL (ref 0.2–1.3)
BUN BLD-MCNC: 15 MG/DL (ref 7–21)
BUN/CREAT SERPL: 18.1 (ref 7–25)
CALCIUM SPEC-SCNC: 9.4 MG/DL (ref 8.4–10.2)
CHLORIDE SERPL-SCNC: 105 MMOL/L (ref 95–110)
CHOLEST SERPL-MCNC: 119 MG/DL (ref 0–199)
CO2 SERPL-SCNC: 27 MMOL/L (ref 22–31)
CREAT BLD-MCNC: 0.83 MG/DL (ref 0.7–1.3)
DEPRECATED RDW RBC AUTO: 47.5 FL (ref 35.1–43.9)
EOSINOPHIL # BLD AUTO: 0.16 10*3/MM3 (ref 0–0.7)
EOSINOPHIL NFR BLD AUTO: 2.8 % (ref 0–7)
ERYTHROCYTE [DISTWIDTH] IN BLOOD BY AUTOMATED COUNT: 15.1 % (ref 11.5–14.5)
GFR SERPL CREATININE-BSD FRML MDRD: 121 ML/MIN/1.73 (ref 63–147)
GLOBULIN UR ELPH-MCNC: 3 GM/DL (ref 2.3–3.5)
GLUCOSE BLD-MCNC: 98 MG/DL (ref 60–100)
HBA1C MFR BLD: 5.9 % (ref 4–5.6)
HCT VFR BLD AUTO: 43.1 % (ref 39–49)
HDLC SERPL-MCNC: 41 MG/DL (ref 60–200)
HGB BLD-MCNC: 14.4 G/DL (ref 13.7–17.3)
IMM GRANULOCYTES # BLD: 0.03 10*3/MM3 (ref 0–0.02)
IMM GRANULOCYTES NFR BLD: 0.5 % (ref 0–0.5)
LDLC/HDLC SERPL: 1.59 {RATIO} (ref 0–3.55)
LYMPHOCYTES # BLD AUTO: 2.03 10*3/MM3 (ref 0.6–4.2)
LYMPHOCYTES NFR BLD AUTO: 35.7 % (ref 10–50)
MCH RBC QN AUTO: 28.6 PG (ref 26.5–34)
MCHC RBC AUTO-ENTMCNC: 33.4 G/DL (ref 31.5–36.3)
MCV RBC AUTO: 85.5 FL (ref 80–98)
MONOCYTES # BLD AUTO: 0.73 10*3/MM3 (ref 0–0.9)
MONOCYTES NFR BLD AUTO: 12.9 % (ref 0–12)
NEUTROPHILS # BLD AUTO: 2.72 10*3/MM3 (ref 2–8.6)
NEUTROPHILS NFR BLD AUTO: 47.9 % (ref 37–80)
PLATELET # BLD AUTO: 250 10*3/MM3 (ref 150–450)
PMV BLD AUTO: 9.5 FL (ref 8–12)
POTASSIUM BLD-SCNC: 3.9 MMOL/L (ref 3.5–5.1)
PROT SERPL-MCNC: 7.3 G/DL (ref 6.3–8.6)
RBC # BLD AUTO: 5.04 10*6/MM3 (ref 4.37–5.74)
SODIUM BLD-SCNC: 141 MMOL/L (ref 137–145)
T4 FREE SERPL-MCNC: 0.85 NG/DL (ref 0.78–2.19)
TRIGL SERPL-MCNC: 64 MG/DL (ref 20–199)
TSH SERPL DL<=0.05 MIU/L-ACNC: 1.24 MIU/ML (ref 0.46–4.68)
WBC NRBC COR # BLD: 5.68 10*3/MM3 (ref 3.2–9.8)

## 2018-07-12 PROCEDURE — 85025 COMPLETE CBC W/AUTO DIFF WBC: CPT | Performed by: FAMILY MEDICINE

## 2018-07-12 PROCEDURE — 84439 ASSAY OF FREE THYROXINE: CPT | Performed by: FAMILY MEDICINE

## 2018-07-12 PROCEDURE — 83036 HEMOGLOBIN GLYCOSYLATED A1C: CPT | Performed by: FAMILY MEDICINE

## 2018-07-12 PROCEDURE — 84443 ASSAY THYROID STIM HORMONE: CPT | Performed by: FAMILY MEDICINE

## 2018-07-12 PROCEDURE — 82306 VITAMIN D 25 HYDROXY: CPT | Performed by: FAMILY MEDICINE

## 2018-07-12 PROCEDURE — 80053 COMPREHEN METABOLIC PANEL: CPT | Performed by: FAMILY MEDICINE

## 2018-07-12 PROCEDURE — 84481 FREE ASSAY (FT-3): CPT | Performed by: FAMILY MEDICINE

## 2018-07-12 PROCEDURE — 80061 LIPID PANEL: CPT | Performed by: FAMILY MEDICINE

## 2018-07-13 LAB — T3FREE SERPL-MCNC: 3.7 PG/ML (ref 2–4.4)

## 2018-07-24 PROBLEM — I25.2 HISTORY OF MI (MYOCARDIAL INFARCTION): Status: ACTIVE | Noted: 2018-07-24

## 2018-09-13 DIAGNOSIS — I25.10 CORONARY ARTERY DISEASE INVOLVING NATIVE CORONARY ARTERY OF NATIVE HEART WITHOUT ANGINA PECTORIS: Primary | ICD-10-CM

## 2018-11-07 RX ORDER — ATORVASTATIN CALCIUM 40 MG/1
TABLET, FILM COATED ORAL
Qty: 30 TABLET | Refills: 2 | Status: SHIPPED | OUTPATIENT
Start: 2018-11-07 | End: 2019-02-07 | Stop reason: SDUPTHER

## 2018-11-07 RX ORDER — AMLODIPINE BESYLATE 10 MG/1
TABLET ORAL
Qty: 30 TABLET | Refills: 2 | Status: SHIPPED | OUTPATIENT
Start: 2018-11-07 | End: 2019-02-07 | Stop reason: SDUPTHER

## 2019-01-03 RX ORDER — TRIAMTERENE AND HYDROCHLOROTHIAZIDE 37.5; 25 MG/1; MG/1
CAPSULE ORAL
Qty: 30 CAPSULE | Refills: 2 | Status: SHIPPED | OUTPATIENT
Start: 2019-01-03 | End: 2019-04-04 | Stop reason: SDUPTHER

## 2019-01-03 RX ORDER — ISOSORBIDE MONONITRATE 60 MG/1
TABLET, EXTENDED RELEASE ORAL
Qty: 30 TABLET | Refills: 2 | Status: SHIPPED | OUTPATIENT
Start: 2019-01-03 | End: 2019-04-04 | Stop reason: SDUPTHER

## 2019-01-08 RX ORDER — METOPROLOL SUCCINATE 50 MG/1
TABLET, EXTENDED RELEASE ORAL
Qty: 30 TABLET | Refills: 2 | Status: SHIPPED | OUTPATIENT
Start: 2019-01-08 | End: 2019-04-15 | Stop reason: SDUPTHER

## 2019-01-08 RX ORDER — LOSARTAN POTASSIUM 100 MG/1
TABLET ORAL
Qty: 30 TABLET | Refills: 2 | Status: SHIPPED | OUTPATIENT
Start: 2019-01-08 | End: 2019-04-15 | Stop reason: SDUPTHER

## 2019-02-07 RX ORDER — ATORVASTATIN CALCIUM 40 MG/1
TABLET, FILM COATED ORAL
Qty: 30 TABLET | Refills: 1 | Status: SHIPPED | OUTPATIENT
Start: 2019-02-07 | End: 2019-04-15 | Stop reason: SDUPTHER

## 2019-02-07 RX ORDER — AMLODIPINE BESYLATE 10 MG/1
TABLET ORAL
Qty: 30 TABLET | Refills: 1 | Status: SHIPPED | OUTPATIENT
Start: 2019-02-07 | End: 2019-04-15 | Stop reason: SDUPTHER

## 2019-03-11 RX ORDER — CLOPIDOGREL BISULFATE 75 MG/1
TABLET ORAL
Qty: 30 TABLET | Refills: 2 | Status: SHIPPED | OUTPATIENT
Start: 2019-03-11 | End: 2019-09-13 | Stop reason: SDUPTHER

## 2019-04-04 RX ORDER — TRIAMTERENE AND HYDROCHLOROTHIAZIDE 37.5; 25 MG/1; MG/1
CAPSULE ORAL
Qty: 30 CAPSULE | Refills: 1 | Status: SHIPPED | OUTPATIENT
Start: 2019-04-04 | End: 2019-06-05 | Stop reason: SDUPTHER

## 2019-04-04 RX ORDER — ISOSORBIDE MONONITRATE 60 MG/1
TABLET, EXTENDED RELEASE ORAL
Qty: 30 TABLET | Refills: 1 | Status: SHIPPED | OUTPATIENT
Start: 2019-04-04 | End: 2019-09-05 | Stop reason: SDUPTHER

## 2019-04-10 RX ORDER — AMLODIPINE BESYLATE 10 MG/1
TABLET ORAL
Qty: 30 TABLET | Refills: 0 | OUTPATIENT
Start: 2019-04-10

## 2019-04-10 RX ORDER — ATORVASTATIN CALCIUM 40 MG/1
TABLET, FILM COATED ORAL
Qty: 30 TABLET | Refills: 0 | OUTPATIENT
Start: 2019-04-10

## 2019-04-10 RX ORDER — METOPROLOL SUCCINATE 50 MG/1
TABLET, EXTENDED RELEASE ORAL
Qty: 30 TABLET | Refills: 1 | OUTPATIENT
Start: 2019-04-10

## 2019-04-10 RX ORDER — LOSARTAN POTASSIUM 100 MG/1
TABLET ORAL
Qty: 30 TABLET | Refills: 1 | OUTPATIENT
Start: 2019-04-10

## 2019-04-15 RX ORDER — AMLODIPINE BESYLATE 10 MG/1
TABLET ORAL
Qty: 30 TABLET | Refills: 0 | Status: SHIPPED | OUTPATIENT
Start: 2019-04-15

## 2019-04-15 RX ORDER — METOPROLOL SUCCINATE 50 MG/1
TABLET, EXTENDED RELEASE ORAL
Qty: 30 TABLET | Refills: 1 | Status: SHIPPED | OUTPATIENT
Start: 2019-04-15

## 2019-04-15 RX ORDER — ATORVASTATIN CALCIUM 40 MG/1
TABLET, FILM COATED ORAL
Qty: 30 TABLET | Refills: 0 | Status: SHIPPED | OUTPATIENT
Start: 2019-04-15

## 2019-04-15 RX ORDER — LOSARTAN POTASSIUM 100 MG/1
TABLET ORAL
Qty: 30 TABLET | Refills: 1 | Status: SHIPPED | OUTPATIENT
Start: 2019-04-15 | End: 2019-09-13 | Stop reason: SDUPTHER

## 2019-06-05 RX ORDER — TRIAMTERENE AND HYDROCHLOROTHIAZIDE 37.5; 25 MG/1; MG/1
CAPSULE ORAL
Qty: 30 CAPSULE | Refills: 0 | Status: SHIPPED | OUTPATIENT
Start: 2019-06-05 | End: 2019-07-05 | Stop reason: SDUPTHER

## 2019-07-08 RX ORDER — TRIAMTERENE AND HYDROCHLOROTHIAZIDE 37.5; 25 MG/1; MG/1
CAPSULE ORAL
Qty: 30 CAPSULE | Refills: 0 | Status: SHIPPED | OUTPATIENT
Start: 2019-07-08 | End: 2019-08-06 | Stop reason: SDUPTHER

## 2019-08-06 RX ORDER — TRIAMTERENE AND HYDROCHLOROTHIAZIDE 37.5; 25 MG/1; MG/1
CAPSULE ORAL
Qty: 30 CAPSULE | Refills: 5 | Status: SHIPPED | OUTPATIENT
Start: 2019-08-06

## 2019-09-05 RX ORDER — ISOSORBIDE MONONITRATE 60 MG/1
TABLET, EXTENDED RELEASE ORAL
Qty: 30 TABLET | Refills: 0 | Status: SHIPPED | OUTPATIENT
Start: 2019-09-05

## 2019-09-13 RX ORDER — CLOPIDOGREL BISULFATE 75 MG/1
TABLET ORAL
Qty: 30 TABLET | Refills: 0 | Status: SHIPPED | OUTPATIENT
Start: 2019-09-13

## 2019-09-13 RX ORDER — LOSARTAN POTASSIUM 100 MG/1
TABLET ORAL
Qty: 30 TABLET | Refills: 0 | Status: SHIPPED | OUTPATIENT
Start: 2019-09-13

## (undated) DEVICE — ANGIO-SEAL EVOLUTION VASCULAR CLOSURE DEVICE: Brand: ANGIO-SEAL

## (undated) DEVICE — ELECTRODE,RT,STRESS,FOAM,50PK: Brand: MEDLINE

## (undated) DEVICE — VSI MICRO-INTRODUCER KITS ARE INTENDED FOR USE IN PERCUTANEOUS INTRODUCTION OF UP TO A 0.018 INCH OR 0.038 INCH GUIDEWIRE OR CATHETER INTO THE VASCULAR SYSTEM FOLLOWING A SMALL GAUGE NEEDLE STICK.: Brand: VSI MICRO-INTRODUCER KIT

## (undated) DEVICE — GW AMPLATZER SS .035 1.5MM J 260CM

## (undated) DEVICE — PK CATH LAB 60

## (undated) DEVICE — GW PERIPH GUIDERIGHT STD/J/TP PTFE/PCOAT SS 0.038IN 5X150CM

## (undated) DEVICE — CATH DIAG EXPO .056 FR4 6F 100CM

## (undated) DEVICE — GW WHOLEY HITORQ MOD/J .035 145CM

## (undated) DEVICE — COPILOT KIT INCLUDES BLEEDBACK CONTROL VALVE / GUIDE WIRE INTRODUCER / TORQUE DEVICE: Brand: ACCESSORIES

## (undated) DEVICE — GW WWIJ35260 WHOLEY WIRE V04: Brand: WHOLEY™

## (undated) DEVICE — DESTINATION PERIPHERAL GUIDING SHEATH: Brand: DESTINATION

## (undated) DEVICE — ST CVR PROB PULLUP ULTRASND 5X48IN

## (undated) DEVICE — MODEL BT2000 P/N 700287-012KIT CONTENTS: MANIFOLD WITH SALINE AND CONTRAST PORTS, SALINE TUBING WITH SPIKE AND HAND SYRINGE, TRANSDUCER: Brand: BT2000 AUTOMATED MANIFOLD KIT

## (undated) DEVICE — RUNTHROUGH NS EXTRA FLOPPY PTCA GUIDEWIRE: Brand: RUNTHROUGH

## (undated) DEVICE — DEV INFL MONARCH 20ML

## (undated) DEVICE — CATH GUIDE SOFTVU SELECT/V HT OMNI .038 5F 65CM

## (undated) DEVICE — TREK CORONARY DILATATION CATHETER 2.50 MM X 20 MM / RAPID-EXCHANGE: Brand: TREK

## (undated) DEVICE — INTRO SHEATH ART/FEM ENGAGE .038 6F12CM

## (undated) DEVICE — NC TREK CORONARY DILATATION CATHETER 3.0 MM X 15 MM / RAPID-EXCHANGE: Brand: NC TREK

## (undated) DEVICE — GC 5F 056 XBLAD 3.5 STAND TIP: Brand: CORDIS